# Patient Record
Sex: FEMALE | Race: WHITE | NOT HISPANIC OR LATINO | Employment: STUDENT | URBAN - METROPOLITAN AREA
[De-identification: names, ages, dates, MRNs, and addresses within clinical notes are randomized per-mention and may not be internally consistent; named-entity substitution may affect disease eponyms.]

---

## 2017-02-16 ENCOUNTER — OFFICE VISIT (OUTPATIENT)
Dept: NEUROLOGY | Facility: CLINIC | Age: 22
End: 2017-02-16
Payer: COMMERCIAL

## 2017-02-16 VITALS
HEIGHT: 59 IN | BODY MASS INDEX: 19.69 KG/M2 | DIASTOLIC BLOOD PRESSURE: 68 MMHG | HEART RATE: 73 BPM | WEIGHT: 97.69 LBS | SYSTOLIC BLOOD PRESSURE: 109 MMHG

## 2017-02-16 DIAGNOSIS — F98.8 ADD (ATTENTION DEFICIT DISORDER): ICD-10-CM

## 2017-02-16 DIAGNOSIS — R56.9 SEIZURE: Primary | ICD-10-CM

## 2017-02-16 PROCEDURE — 99999 PR PBB SHADOW E&M-NEW PATIENT-LVL III: CPT | Mod: PBBFAC,,, | Performed by: PSYCHIATRY & NEUROLOGY

## 2017-02-16 PROCEDURE — 99205 OFFICE O/P NEW HI 60 MIN: CPT | Mod: S$GLB,,, | Performed by: PSYCHIATRY & NEUROLOGY

## 2017-02-16 NOTE — PROGRESS NOTES
EPILEPSY CLINIC:   INITIAL VISIT    Name: Bhavna Han  MRN:[id number]   CSN: [id number]  Date of service: 2/16/2017    Age:21 y.o.   Gender:female     Referring Physician/Service: Self Referral  No address on file   The patient is here today with her mother  History obtained from the patient and her mother    CHIEF COMPLAINT: Consult  - evaluation of 1st seizure    PRESENT ILLNESS:      This is a 21 y.o.  year old right handed female who presents for evaluation of 1st seizure  Chief Complaint   Patient presents with    Consult      1st seizure on 02/02/2017:  Patient states that she was out the night prior the seizure, was up until ~ 2:30am and admits to having etoh (admits to be being drunk) and smoking marijuana. She was on antibiotics for UTI at the time as well.  Reports waking up feeling fine (denies any hangover) and ~09:00 she was walking to her dorm followed by LOC. Her next recollection was waking up on the ground with people around her.  She reportedly fell to the ground and had generalized shaking with her eyes rolled up and frothing at the mouth - lasted ~ 2-3 min. Patient reports associated tongue bit but  No hx of bladder/bowel incontinence. Reports brief (few min) post-ictal confusion.  EMS was called and she was evaluated at [facility]: Labs showed methamphetamine and marijuana positive; CT head was negative, and no neurological deficits. Not started on any meds.    No further episodes.    Possible lateralizing signs by history: (Neurological signs): none    Any other relevant information:  Hx of 'blackout' episodes with etoh use - approximately 5 episodes    PREVIOUS EVALUATIONS:    Previous EEGs: none  Previous MRIs: none    Additional tests:  1)CT Scan: no abnormalities (per report, [facility], 2/2/17)  2) EEG\Video Monitoring: no  3) PET Scan: no  4) Neuropsychological evaluation: no  5) DEXA Scan: no  6) Others: no    RISK FACTORS FOR SEIZURES:    1. Head Trauma:  No    2. CNS Infections:  No  3. CNS  Tumors: No     4. CNS Vascular Disease: No     5. Febrile Seizures: No  - but reports one episode at 18mo of age when she 'went limp' -  Associated with dehydration. No convulsive event noted. Was evaluated at the time.  6. Developmental Delay: No       7. Family History of Seizures: No    8. Birth history: FTNVD    Pregnancy/Labor/Delivery: n/a    CURRENT MEDICATIONS:   No current outpatient prescriptions on file.     No current facility-administered medications for this visit.       Folic acid: no    CURRENT ANTI EPILEPTIC MEDICATIONS: none    VAGAL NERVE STIMULATOR: n/a    PRIOR ANTICONVULSANT HISTORY:  none      PAST MEDICAL HISTORY:   Active Ambulatory Problems     Diagnosis Date Noted    No Active Ambulatory Problems     Resolved Ambulatory Problems     Diagnosis Date Noted    No Resolved Ambulatory Problems     No Additional Past Medical History        PAST PSYCHIATRIC HISTORY: ADD - diagnosed at age 15yrs - maintained Vivanz 50mg q day    PAST SURGICAL HISTORY including Epilepsy surgery: History reviewed. No pertinent past surgical history.     FAMILY HISTORY: History reviewed. No pertinent family history.      SOCIAL HISTORY:   Social History     Social History    Marital status: Single     Spouse name: N/A    Number of children: N/A    Years of education: N/A     Occupational History    Not on file.     Social History Main Topics    Smoking status: Never Smoker    Smokeless tobacco: Not on file    Alcohol use Not on file    Drug use: Not on file    Sexual activity: Not on file     Other Topics Concern    Not on file     Social History Narrative    No narrative on file      a) Marital status: Single                                                    b) Living situation: patient lives with in the dorm at [facility]  c) Employed/Unemployed/Other: Student - mass communication at [employer]    DRIVING HISTORY:  Currently driving: No      LEVEL OF EDUCATION: student at [employer]    SUBSTANCE USE: uses  "marijuana occasionally - 1-2 per month; regular etoh use 1-2 per week; no other substance use    ALLERGIES: Review of patient's allergies indicates no known allergies.     REVIEW OF SYSTEMS:  Review of Systems   Constitutional: Negative for appetite change and fatigue.   HENT: Negative for dental problem and sore throat.    Eyes: Negative for photophobia and pain.   Respiratory: Negative for cough and shortness of breath.    Cardiovascular: Negative for chest pain and palpitations.   Gastrointestinal: Negative for nausea and vomiting.   Endocrine: Negative for polydipsia and polyuria.   Genitourinary: Negative for menstrual problem and vaginal bleeding.   Musculoskeletal: Negative for arthralgias and joint swelling.   Skin: Negative for rash and wound.   Allergic/Immunologic: Negative for immunocompromised state.   All other systems reviewed and are negative.      GENERAL EXAMINATION:  Visit Vitals    /68    Pulse 73    Ht 4' 11" (1.499 m)    Wt 44.3 kg (97 lb 10.6 oz)    BMI 19.73 kg/m2      General Appearance:    This is an small-built female who appears well.  HEENT: There are no dysmorphic features  Skin: There are no obvious stigmata for neurocutaneous disorders.  Neck: supple   Cardiovascular: regular rate and rhythm  Lungs: clear  Abdomen: deferred  The spine is non-tender.  Kyphosis:  No Scoliosis: No   Extremities: Warm and well perfused    NEUROLOGICAL EXAMINATION:  Mental status: Alert and oriented x 4; pleasant and cooperative with exam  Memory: Recent memory intact, Remote memory intact, Age correct, Month correct  Attention and concentration: intact  Fund of knowledge: adequate  Speech: normal  Dysarthria: No   Eyes: PERRL; EOM intact; No nystagmus.The visual pursuits  were smooth with normal saccadic eye movements.   Fundoscopic Exam: normal w/o hemorrhages, exudates, or papilledema  No facial asymmetry. Intact facial sensation bilaterally.  Hearing was intact bilaterally to finger " rub  Tongue and palate was in the midline  Intact SCM and trapezii bilaterally     Motor examination:  Normal bulk and tone bilaterally. Power: no pronater drift; 5/5 bilaterally symmetric UE/LE  Abnormal movements: none  Deep tendon reflexes: 2+ bilaterally symmetric UE/LE with flexor plantars  Dysmetria: No     Sensory examination:   Normal, light touch, pin prick, and vibration bilaterally symmetric UE/LE    Gait:  Normal gait and station; able to tandem walk without any difficulty    OTHER RELEVANT LABS AND TESTS:    IMPRESSION:  The patient's history is suggestive of 1st seizure - likely related to etoh use and sleep deprivation    Related Condition(s): none    PLAN:  1) MRI Brain  2) Sleep-deprived extended >1hr EEG  3) No AEDs at this time  4) Continue Vivanz at current doses    Discussed in detail re diagnosis and plan of care. Also advised re avoidance of triggers as well as seizure precautions/restrictions.   Patient and her mother indicated understanding.    2014 EPILEPSY QUALITY MEASUREMENT (AAN)  1 a. Seizure Frequency: as noted  1b. Seizure Intervention: as noted    2.  a. Etiology: as noted  b. Seizure Type: asnoted  c. Epilepsy Syndrome: n/a    3.  a. Side-effects of AED: n/a   b. Intervention for side-effects: n/a      4. Screening for psychiatric or behavioral disorders: yes    5. Personalized epilepsy safety issues and education: yes    6. Counseling for women of child-bearing potential and epilepsy: yes     7. Referral of treatment-resistant epilepsy to comprehensive epilepsy center (q 2 years): N/A.    The patient was asked to call me/the clinic 1 week after the test(s) are done to obtain results.    The following issues were  all discussed in detail with the patient and family/caregiver(s):    1. Diagnosis, plans, prognosis, medications and possible side-effects, risks and benefits of treatment, other alternatives to AEDs.  2. Risks related to continued seizures, status epilepticus, SUDEP,  driving restrictions and seizure precautions ( no baths but showers are ok, no swimming unsupervised, no use of heavy machinery, no use of sharp moving objects, avoid heights).   3. Issues related to pregnancy, OCP and breast feeding as it relates to epilepsy.  4. The potential of teratogenicity and suicidal risks of anti-epileptic medications.  5.Avoid any activity that compromise patient safety related to seizures.     Questions and concerns raised by the patient and family/care-giver(s) were addressed and they indicated understanding of everything discussed and agreed to plans as above.    Return in 4-6 weeks, after tests are done or earlier prn.    Briseida Rawls MD, TOM().  Neurology-Epilepsy.

## 2017-03-01 ENCOUNTER — HOSPITAL ENCOUNTER (OUTPATIENT)
Dept: RADIOLOGY | Facility: HOSPITAL | Age: 22
Discharge: HOME | End: 2017-03-01
Attending: PSYCHIATRY & NEUROLOGY
Payer: COMMERCIAL

## 2017-03-01 DIAGNOSIS — R56.9 SEIZURE: ICD-10-CM

## 2017-03-01 PROCEDURE — 70551 MRI BRAIN STEM W/O DYE: CPT | Mod: TC

## 2017-03-01 PROCEDURE — 70551 MRI BRAIN STEM W/O DYE: CPT | Mod: 26,,, | Performed by: RADIOLOGY

## 2017-03-03 ENCOUNTER — HOSPITAL ENCOUNTER (OUTPATIENT)
Dept: NEUROLOGY | Facility: HOSPITAL | Age: 22
Discharge: HOME | End: 2017-03-03
Attending: PSYCHIATRY & NEUROLOGY
Payer: COMMERCIAL

## 2017-03-03 DIAGNOSIS — R56.9 SEIZURE: ICD-10-CM

## 2017-03-03 PROCEDURE — 95816 EEG AWAKE AND DROWSY: CPT

## 2017-03-03 PROCEDURE — 95819 EEG AWAKE AND ASLEEP: CPT | Mod: 26,,, | Performed by: PSYCHIATRY & NEUROLOGY

## 2017-03-03 NOTE — PROCEDURES
DATE OF SERVICE:  03/03/2017.    Routine outpatient EEG done at [facility].    DURATION OF THE STUDY:  34 minutes and 35 seconds.    ELECTROENCEPHALOGRAM REPORT    METHODOLOGY:  Electroencephalographic (EEG) recording is recorded with   electrodes placed according to the International 10-20 placement system.  Thirty   two (32) channels of digital signal (sampling rate of 512/sec), including T1   and T2, were simultaneously recorded from the scalp and may include EKG, EMG,   and/or eye monitors.  Recording band pass was 0.1 to 512 Hz.  Digital video   recording of the patient is simultaneously recorded with the EEG.  The patient   is instructed to report clinical symptoms which may occur during the recording   session.  EEG and video recording are stored and archived in digital format.    Activation procedures, which include photic stimulation, hyperventilation and   instructing patients to perform simple tasks, are done in selected patients.    The EEG is displayed on a monitor screen and can be reviewed using different   montages.  Computer assisted-analysis is employed to detect spike and   electrographic seizure activity.   The entire record is submitted for computer   analysis.  The entire recording is visually reviewed, and the times identified   by computer analysis as being spikes or seizures are reviewed again.    Compressed spectral analysis (CSA) is also performed on the activity recorded   from each individual channel.  This is displayed as a power display of   frequencies from 0 to 30 Hz over time.   The CSA is reviewed looking for   asymmetries in power between homologous areas of the scalp, then compared with   the original EEG recording.    Wiki[employer] software was also utilized in the review of this study.  This software   suite analyzes the EEG recording in multiple domains.  Coherence and rhythmicity   are computed to identify EEG sections which may contain organized seizures.    Each channel  undergoes analysis to detect the presence of spike and sharp waves   which have special and morphological characteristics of epileptic activity.  The   routine EEG recording is converted from special into frequency domain.  This is   then displayed comparing homologous areas to identify areas of significant   asymmetry.  Algorithm to identify non-cortically generated artifact is used to   separate artifact from the EEG.    FINDINGS:  Initially, the background of this study reveals predominantly alpha   activity with a well-formed, well-modulated 11 Hz posterior dominant rhythm seen   best in the occipital channels.  There is also scattered beta frequency   activity seen centrally and anteriorly.  After a few pages, the alpha disappears   and central beta activity predominates.  The patient appears to be drowsy.    Occasional vertex waves are seen and central sleep spindles then emerged   indicating the presence of stage II sleep.  The patient waxes and wanes between   wakefulness and stage II sleep over the next several pages.    In the final ____ of the study, photic stimulation is performed revealing a   brisk, symmetrical driving response at multiple frequencies bilaterally.    Hyperventilation is then performed and causes no significant change to the   background rhythms.  Hyperventilation was terminated early due to the patient   subjectively feeling dizzy.    There are no epileptiform discharges or electrographic seizures during this   study.  There were no focal findings.  EKG revealed regular rhythm.    INTERPRETATION:  Normal, awake and asleep EEG.      NBB/SN  dd: 03/03/2017 13:54:53 (CST)  td: 03/03/2017 15:11:25 (CST)  Doc ID   #[id number]  Job ID #[id number]    CC:

## 2017-03-10 ENCOUNTER — PATIENT MESSAGE (OUTPATIENT)
Dept: NEUROLOGY | Facility: CLINIC | Age: 22
End: 2017-03-10

## 2017-04-01 ENCOUNTER — HOSPITAL ENCOUNTER (INPATIENT)
Facility: HOSPITAL | Age: 22
LOS: 4 days | Discharge: HOME | DRG: 101 | End: 2017-04-05
Attending: EMERGENCY MEDICINE | Admitting: EMERGENCY MEDICINE
Payer: COMMERCIAL

## 2017-04-01 DIAGNOSIS — G40.919 BREAKTHROUGH SEIZURE: Primary | ICD-10-CM

## 2017-04-01 DIAGNOSIS — G40.319 INTRACTABLE GENERALIZED IDIOPATHIC EPILEPSY WITHOUT STATUS EPILEPTICUS: ICD-10-CM

## 2017-04-01 PROBLEM — F90.9 ADHD (ATTENTION DEFICIT HYPERACTIVITY DISORDER): Status: ACTIVE | Noted: 2017-04-01

## 2017-04-01 LAB
ALBUMIN SERPL BCP-MCNC: 4 G/DL
ALP SERPL-CCNC: 75 U/L
ALT SERPL W/O P-5'-P-CCNC: 14 U/L
AMORPH CRY UR QL COMP ASSIST: ABNORMAL
ANION GAP SERPL CALC-SCNC: 11 MMOL/L
AST SERPL-CCNC: 20 U/L
B-HCG UR QL: NEGATIVE
BASOPHILS # BLD AUTO: 0.02 K/UL
BASOPHILS NFR BLD: 0.2 %
BILIRUB SERPL-MCNC: 0.4 MG/DL
BILIRUB UR QL STRIP: NEGATIVE
BUN SERPL-MCNC: 12 MG/DL
CALCIUM SERPL-MCNC: 10.1 MG/DL
CHLORIDE SERPL-SCNC: 109 MMOL/L
CLARITY UR REFRACT.AUTO: ABNORMAL
CO2 SERPL-SCNC: 21 MMOL/L
COLOR UR AUTO: YELLOW
CREAT SERPL-MCNC: 0.8 MG/DL
CTP QC/QA: YES
DIFFERENTIAL METHOD: ABNORMAL
EOSINOPHIL # BLD AUTO: 0 K/UL
EOSINOPHIL NFR BLD: 0.1 %
ERYTHROCYTE [DISTWIDTH] IN BLOOD BY AUTOMATED COUNT: 12.9 %
EST. GFR  (AFRICAN AMERICAN): >60 ML/MIN/1.73 M^2
EST. GFR  (NON AFRICAN AMERICAN): >60 ML/MIN/1.73 M^2
GLUCOSE SERPL-MCNC: 91 MG/DL
GLUCOSE UR QL STRIP: NEGATIVE
HCT VFR BLD AUTO: 41.8 %
HGB BLD-MCNC: 14 G/DL
HGB UR QL STRIP: NEGATIVE
KETONES UR QL STRIP: NEGATIVE
LEUKOCYTE ESTERASE UR QL STRIP: NEGATIVE
LYMPHOCYTES # BLD AUTO: 1.4 K/UL
LYMPHOCYTES NFR BLD: 16.6 %
MCH RBC QN AUTO: 30.1 PG
MCHC RBC AUTO-ENTMCNC: 33.5 %
MCV RBC AUTO: 90 FL
MICROSCOPIC COMMENT: ABNORMAL
MONOCYTES # BLD AUTO: 0.6 K/UL
MONOCYTES NFR BLD: 6.7 %
NEUTROPHILS # BLD AUTO: 6.5 K/UL
NEUTROPHILS NFR BLD: 76.2 %
NITRITE UR QL STRIP: NEGATIVE
PH UR STRIP: 7 [PH] (ref 5–8)
PLATELET # BLD AUTO: 298 K/UL
PMV BLD AUTO: 9.7 FL
POTASSIUM SERPL-SCNC: 3.5 MMOL/L
PROT SERPL-MCNC: 7.6 G/DL
PROT UR QL STRIP: NEGATIVE
RBC # BLD AUTO: 4.65 M/UL
RBC #/AREA URNS AUTO: 7 /HPF (ref 0–4)
SODIUM SERPL-SCNC: 141 MMOL/L
SP GR UR STRIP: 1.02 (ref 1–1.03)
URN SPEC COLLECT METH UR: ABNORMAL
UROBILINOGEN UR STRIP-ACNC: NEGATIVE EU/DL
WBC # BLD AUTO: 8.5 K/UL

## 2017-04-01 PROCEDURE — 99284 EMERGENCY DEPT VISIT MOD MDM: CPT | Mod: ,,, | Performed by: EMERGENCY MEDICINE

## 2017-04-01 PROCEDURE — 81001 URINALYSIS AUTO W/SCOPE: CPT

## 2017-04-01 PROCEDURE — 63600175 PHARM REV CODE 636 W HCPCS: Performed by: HOSPITALIST

## 2017-04-01 PROCEDURE — 63600175 PHARM REV CODE 636 W HCPCS: Performed by: EMERGENCY MEDICINE

## 2017-04-01 PROCEDURE — 80053 COMPREHEN METABOLIC PANEL: CPT

## 2017-04-01 PROCEDURE — 96365 THER/PROPH/DIAG IV INF INIT: CPT

## 2017-04-01 PROCEDURE — 11000001 HC ACUTE MED/SURG PRIVATE ROOM

## 2017-04-01 PROCEDURE — 85025 COMPLETE CBC W/AUTO DIFF WBC: CPT

## 2017-04-01 PROCEDURE — 99285 EMERGENCY DEPT VISIT HI MDM: CPT | Mod: 25

## 2017-04-01 PROCEDURE — 99223 1ST HOSP IP/OBS HIGH 75: CPT | Mod: ,,, | Performed by: INTERNAL MEDICINE

## 2017-04-01 PROCEDURE — 80177 DRUG SCRN QUAN LEVETIRACETAM: CPT

## 2017-04-01 PROCEDURE — 81025 URINE PREGNANCY TEST: CPT | Performed by: EMERGENCY MEDICINE

## 2017-04-01 RX ORDER — ACETAMINOPHEN 325 MG/1
650 TABLET ORAL EVERY 8 HOURS PRN
Status: DISCONTINUED | OUTPATIENT
Start: 2017-04-01 | End: 2017-04-05 | Stop reason: HOSPADM

## 2017-04-01 RX ORDER — LEVETIRACETAM 500 MG/1
500 TABLET ORAL 2 TIMES DAILY
COMMUNITY
End: 2017-04-25

## 2017-04-01 RX ORDER — LEVETIRACETAM 500 MG/1
1000 TABLET ORAL 2 TIMES DAILY
Status: DISCONTINUED | OUTPATIENT
Start: 2017-04-02 | End: 2017-04-03

## 2017-04-01 RX ORDER — LISDEXAMFETAMINE DIMESYLATE 50 MG/1
50 CAPSULE ORAL EVERY MORNING
COMMUNITY

## 2017-04-01 RX ORDER — GLUCAGON 1 MG
1 KIT INJECTION
Status: DISCONTINUED | OUTPATIENT
Start: 2017-04-01 | End: 2017-04-05 | Stop reason: HOSPADM

## 2017-04-01 RX ORDER — ENOXAPARIN SODIUM 100 MG/ML
40 INJECTION SUBCUTANEOUS EVERY 24 HOURS
Status: DISCONTINUED | OUTPATIENT
Start: 2017-04-01 | End: 2017-04-05 | Stop reason: HOSPADM

## 2017-04-01 RX ORDER — IBUPROFEN 200 MG
16 TABLET ORAL
Status: DISCONTINUED | OUTPATIENT
Start: 2017-04-01 | End: 2017-04-05 | Stop reason: HOSPADM

## 2017-04-01 RX ORDER — LORAZEPAM 2 MG/ML
1 INJECTION INTRAMUSCULAR EVERY 4 HOURS PRN
Status: DISCONTINUED | OUTPATIENT
Start: 2017-04-01 | End: 2017-04-02

## 2017-04-01 RX ORDER — ONDANSETRON 8 MG/1
8 TABLET, ORALLY DISINTEGRATING ORAL EVERY 8 HOURS PRN
Status: DISCONTINUED | OUTPATIENT
Start: 2017-04-01 | End: 2017-04-05 | Stop reason: HOSPADM

## 2017-04-01 RX ORDER — DESOGESTREL AND ETHINYL ESTRADIOL 21-5 (28)
1 KIT ORAL DAILY
Status: DISCONTINUED | OUTPATIENT
Start: 2017-04-02 | End: 2017-04-02

## 2017-04-01 RX ORDER — LEVETIRACETAM 10 MG/ML
1000 INJECTION INTRAVASCULAR
Status: COMPLETED | OUTPATIENT
Start: 2017-04-01 | End: 2017-04-01

## 2017-04-01 RX ORDER — IBUPROFEN 200 MG
24 TABLET ORAL
Status: DISCONTINUED | OUTPATIENT
Start: 2017-04-01 | End: 2017-04-05 | Stop reason: HOSPADM

## 2017-04-01 RX ADMIN — LEVETIRACETAM 1000 MG: 10 INJECTION INTRAVENOUS at 08:04

## 2017-04-01 RX ADMIN — ENOXAPARIN SODIUM 40 MG: 100 INJECTION SUBCUTANEOUS at 10:04

## 2017-04-01 NOTE — ED PROVIDER NOTES
"Encounter Date: 4/1/2017       History     Chief Complaint   Patient presents with    Seizures     Review of patient's allergies indicates:  No Known Allergies  HPI Comments: 23 y/o F Robert sophbib with h/o ADHD, seizure dz on keppra presents s/p witnessed sz. Pt had had 1st sz early this month, after which she presented to [facility] and subsequently had neg EEG and MRI done at [facility]. She re-presented to [facility] ED later with 2nd time sz, was started on keppra 500 BID and given strict seizure precautions, with f/u appt 4/14. She was in usual state of health when she had a 3rd sz PTA despite reporting compliance with keppra use. Pt was taking a shower when she apparently fell down and her roommate heard a "thump," found her to have GTC activity of unk duration (likely < 1-2 min)- with associated tongue biting and brief post-ictal pd- self resolved. She admits to smoking marijuana yest, but denies other intox and states she smokes marijuana anyways. Still taking her ADHD med which she was instructed to continue per [facility] neuro. Denies HA, n/v, f/c, URI sx, recent illness, focal neuro or visual changes.    The history is provided by the patient.            History reviewed. No pertinent past medical history.  History reviewed. No pertinent surgical history.  History reviewed. No pertinent family history.  Social History   Substance Use Topics    Smoking status: Never Smoker    Smokeless tobacco: None    Alcohol use None     Review of Systems   Constitutional: Negative for chills, diaphoresis, fatigue and fever.   HENT: Negative for congestion, rhinorrhea, sinus pressure and sore throat.    Eyes: Negative for visual disturbance.   Respiratory: Negative for cough and shortness of breath.    Cardiovascular: Negative for chest pain and palpitations.   Gastrointestinal: Negative for abdominal pain, blood in stool, constipation, diarrhea, nausea and vomiting.   Genitourinary: Negative for difficulty urinating, dysuria, flank " pain, frequency, hematuria, pelvic pain, urgency, vaginal bleeding and vaginal discharge.   Musculoskeletal: Negative for back pain and neck pain.   Skin: Negative for rash.   Neurological: Positive for seizures. Negative for dizziness, tremors, syncope, speech difficulty, weakness, light-headedness, numbness and headaches.   Hematological: Does not bruise/bleed easily.   Psychiatric/Behavioral: Negative for behavioral problems and confusion.       Physical Exam   Initial Vitals   BP Pulse Resp Temp SpO2   04/01/17 1602 04/01/17 1602 04/01/17 1602 04/01/17 1602 04/01/17 1602   126/90 120 20 98 °F (36.7 °C) 100 %     Physical Exam    Nursing note and vitals reviewed.  Constitutional: She appears well-developed and well-nourished. She is not diaphoretic. No distress.   Well appearing young thin F in NAD with friend at bedside   HENT:   Head: Normocephalic and atraumatic.   Mouth/Throat: Oropharynx is clear and moist. No oropharyngeal exudate.   MMM, no e/o tongue biting   Eyes: EOM are normal. Pupils are equal, round, and reactive to light. No scleral icterus.   No horiz/vertic nystagmus or diplopia   Neck: Normal range of motion. Neck supple. No tracheal deviation present. No JVD present.   Neck FROM; no midline / paraspinal c-spine TTP stepoff or deformity   Cardiovascular: Normal rate, regular rhythm and intact distal pulses. Exam reveals no gallop and no friction rub.    No murmur heard.  Pulmonary/Chest: Breath sounds normal. No stridor. She has no wheezes. She has no rhonchi. She has no rales.   Abdominal: Soft. There is no tenderness.   Musculoskeletal: She exhibits no edema or tenderness.   Neurological: She is alert and oriented to person, place, and time. She has normal strength. She displays no tremor and normal reflexes. No cranial nerve deficit or sensory deficit. She exhibits normal muscle tone. She displays a negative Romberg sign. She displays no seizure activity. Coordination and gait (narrow based  and steady) normal. GCS eye subscore is 4. GCS verbal subscore is 5. GCS motor subscore is 6.   Reflex Scores:       Patellar reflexes are 2+ on the right side and 2+ on the left side.  Neg dysmetria, ddk, pronator drift, clonus   Skin: Skin is warm and dry. No rash noted.   Psychiatric: She has a normal mood and affect. Thought content normal.       Bhaskar Garcia MD  4/1/17 10:32 PM    ED Course   Procedures  Labs Reviewed   COMPREHENSIVE METABOLIC PANEL - Abnormal; Notable for the following:        Result Value    CO2 21 (*)     All other components within normal limits   CBC W/ AUTO DIFFERENTIAL - Abnormal; Notable for the following:     Gran% 76.2 (*)     Lymph% 16.6 (*)     All other components within normal limits   URINALYSIS - Abnormal; Notable for the following:     Appearance, UA Cloudy (*)     All other components within normal limits   URINALYSIS MICROSCOPIC - Abnormal; Notable for the following:     RBC, UA 7 (*)     Amorphous, UA Many (*)     All other components within normal limits   LEVETIRACETAM  (KEPPRA) LEVEL   POCT URINE PREGNANCY                   APC / Resident Notes:   HO-3 MDM: Pt presented as above- with apparent breakthrough GTC sz (similar to her priors) despite compliance with new keppra regimen. NC/AT, neuro non-focal, and labs unremarkable. C-spine cleared per [facility]. CT head was ordered from triage, given pt's fall and need to r/o ICH as cause for her sz, and resulted as neg. We spoke with epileptologist on-call, who strongly recommended that pt be admitted for at least several days, for continuous monitoring including continuous EEG, given the higher risk that she may have subsequent sz, and need to obtain clearer dx of epilepsy vs alternative cause for her sz. Pt was keppra loaded here, and admitted to medicine / inpt epilepsy / step-down unit, with epileptology service planning to follow her in AM (when c/s by hospitalist). Case and plan d/w staff Dr. Miller.  Bhaskar Garcia MD  4/1/17  10:34 PM         Attending Attestation:   Physician Attestation Statement for Resident:  As the supervising MD   Physician Attestation Statement: I have personally seen and examined this patient.   I agree with the above history. -: 23 yo f, recently dx'd w epilepsy, started on keppra, here with breakthrough seizure, at baseline MS now with normal neuro exam.  Will discuss AED management and dosing with neurology but anticipate that pt will likely be able to be d/c'd home   As the supervising MD I agree with the above PE.    As the supervising MD I agree with the above treatment, course, plan, and disposition.  I have reviewed and agree with the residents interpretation of the following: lab data and CT scans.  I have reviewed the following: old records at this facility.                    ED Course     Clinical Impression:   The encounter diagnosis was Breakthrough seizure.          Bhaskar Garcia MD  Resident  04/01/17 1828       Sylwia Miller MD  04/03/17 1835

## 2017-04-01 NOTE — LETTER
April 5, 2017         [address]  [place] [address]  Phone: [phone]  Fax: [phone]       Patient: Bhavna Han   YOB: 1995  Date of Visit: 04/05/2017    To Whom It May Concern:    Bhavna Parmar was admitted as a patient at [facility] from 04/01/2017 to  04/05/2017. She may return to school on 04/10/2017 with no restrictions. If you have any questions or concerns, or if I can be of further assistance, please do not hesitate to contact me.    Sincerely,    Suleiman Bojorquez IV, MD

## 2017-04-01 NOTE — PROVIDER PROGRESS NOTES - EMERGENCY DEPT.
Encounter Date: 4/1/2017    ED Physician Progress Notes       SCRIBE NOTE: I, Miroslava Triplett, am scribing for, and in the presence of,  Dr. Singer.  Physician Statement: I, Dr. Singer, personally performed the services described in this documentation as scribed by Miroslava Triplett in my presence, and it is both accurate and complete.     Physician Note:   Time seen by provider: 4:37 PM    This is a 22 y.o. female with no pertinent medical history who presents s/p seizure prior to arrival. Per pt's friend, the episode lasted 5 minutes and she hit the back of her head. She states the pt was confused for several minutes after the episode. Pt notes this is her third seizure and she is currently taking keppra 50 mg. She is concerned the keppra could be reacting with her birth control. She notes she had a headache and was nauseated after the episode but both of these have resolved. Pt denies increased stress, dental infections, fever, dysuria, weakness, fatigue, and recent alcohol use. Pt does note that she smoke marijuana last night.     I initially evaluated this patient and ordered workup while in intake.  The patient will receive a full evaluation in an ED pod when space is available.  All results from tests ordered in intake will not be followed by the intake team, including myself. All results will be followed by the ED Pod team.

## 2017-04-01 NOTE — IP AVS SNAPSHOT
[facility]  [address]  [facility] [address]  Phone: [phone]           Patient Discharge Instructions   Our goal is to set you up for success. This packet includes information on your condition, medications, and your home care.  It will help you care for yourself to prevent having to return to the hospital.     Please ask your nurse if you have any questions.      There are many details to remember when preparing to leave the hospital. Here is what you will need to do:    1. Take your medicine. If you are prescribed medications, review your Medication List on the following pages. You may have new medications to  at the pharmacy and others that you'll need to stop taking. Review the instructions for how and when to take your medications. Talk with your doctor or nurses if you are unsure of what to do.     2. Go to your follow-up appointments. Specific follow-up information is listed in the following pages. Your may be contacted by a nurse or clinical provider about future appointments. Be sure we have all of the phone numbers to reach you. Please contact your provider's office if you are unable to make an appointment.     3. Watch for warning signs. Your doctor or nurse will give you detailed warning signs to watch for and when to call for assistance. These instructions may also include educational information about your condition. If you experience any of warning signs to your health, call your doctor.           [facility]  Unless otherwise directed by your provider, please   contact [facility], our nurse care line   that is available for 24/7 assistance.     [phone] (toll-free)     Registered nurses in the [facility]   provide: appointment scheduling, clinical advisement, health education, and other advisory services.                  ** Verify the list of medication(s) below is accurate and up to date. Carry this with you in case of  emergency. If your medications have changed, please notify your healthcare provider.             Medication List      START taking these medications        Additional Info                      lamotrigine 25 MG tablet   Commonly known as:  LAMICTAL   Quantity:  30 tablet   Refills:  0   Dose:  25 mg    Last time this was given:  25 mg on 4/5/2017  8:55 AM   Instructions:  Take 1 tablet (25 mg total) by mouth once daily.     Begin Date    AM    Noon    PM    Bedtime         CONTINUE taking these medications        Additional Info                      doxycycline 50 MG tablet   Commonly known as:  ADOXA   Refills:  0   Dose:  50 mg    Instructions:  Take 50 mg by mouth daily as needed (for acne).     Begin Date    AM    Noon    PM    Bedtime       levetiracetam 500 MG Tab   Commonly known as:  KEPPRA   Refills:  0   Dose:  500 mg    Last time this was given:  1,000 mg on 4/3/2017  9:35 AM   Instructions:  Take 500 mg by mouth 2 (two) times daily.     Begin Date    AM    Noon    PM    Bedtime       lisdexamfetamine 50 MG capsule   Commonly known as:  VYVANSE   Refills:  0   Dose:  50 mg    Last time this was given:  50 mg on 4/5/2017  8:56 AM   Instructions:  Take 50 mg by mouth every morning.     Begin Date    AM    Noon    PM    Bedtime       LO LOESTRIN FE 1 mg-10 mcg (24)/10 mcg (2) Tab   Refills:  0   Generic drug:  norethindrone-e.estradiol-iron    Instructions:  Take by mouth.     Begin Date    AM    Noon    PM    Bedtime            Where to Get Your Medications      These medications were sent to [facility] [address] [facility] [address] AT [facility] [address]  [facility] [address] [facility] [address]    Hours:  24-hours Phone:  [phone]     lamotrigine 25 MG tablet                  Please bring to all follow up appointments:    1. A copy of your discharge instructions.  2. All medicines you are currently taking in their original bottles.  3. Identification and insurance  card.    Please arrive 15 minutes ahead of scheduled appointment time.    Please call 24 hours in advance if you must reschedule your appointment and/or time.        Your Scheduled Appointments     Apr 17, 2017  3:30 PM CDT   Follow Up/Office Visit with Briseida Rawls MD   [facility] - Neurology Epilepsy ([facility] )    [address], [address]  [facility] [id number] [phone]              Follow-up Information     Follow up with Briseida Rawls MD On 4/17/2017.    Specialty:  Neurology    Why:  At regularly scheduled appointment    Contact information:    [address]  [id number]  [facility] [address]  [phone]          Discharge Instructions     Future Orders    Activity as tolerated     Call MD for:  difficulty breathing or increased cough     Call MD for:  increased confusion or weakness     Call MD for:  persistent dizziness, light-headedness, or visual disturbances     Call MD for:  persistent nausea and vomiting or diarrhea     Call MD for:  redness, tenderness, or signs of infection (pain, swelling, redness, odor or green/yellow discharge around incision site)     Call MD for:  severe persistent headache     Call MD for:  severe uncontrolled pain     Call MD for:  temperature >100.4     Call MD for:  worsening rash     Diet general     Questions:    Total calories:      Fat restriction, if any:      Protein restriction, if any:      Na restriction, if any:      Fluid restriction:      Additional restrictions:          Discharge Instructions       Date Name Bhavna Han MRN [id number]      4/4/2017             Lamictal  Lamictal   Keppra     Dosage 25 mg  100 mg  500 mg     Yesterday 0   0   0  0   2017/4/4 1   0   1  1   2017/4/11 2   0   1  1   2017/4/18 3   0   1  1   2017/4/25 0   1   1  1   2017/5/2 1   1   1  1   2017/5/9 2   1   ½  ½   2017/5/16 3   1   0 stop 0   2017/5/23 0   2 stay on this dose            Primary Diagnosis     Your primary diagnosis was:  Seizure Disorder     "  Admission Information     Date & Time Provider Department CSN    4/1/2017  4:10 PM Lukas Schuster MD [facility] [id number]      Care Providers     Provider Role Specialty Primary office phone    Lukas Schuster MD Attending Provider Hospitalist [phone]    Lukas Schuster MD Team Attending  Hospitalist [phone]      Your Vitals Were     BP Pulse Temp Resp Height Weight    111/59 (BP Location: Right arm, Patient Position: Lying, BP Method: Automatic) 80 97.7 °F (36.5 °C) (Oral) 14 4' 11" (1.499 m) 42.2 kg (93 lb)    SpO2 BMI             95% 18.78 kg/m2         Recent Lab Values     No lab values to display.      Allergies as of 4/5/2017        Reactions    Tramadol Other (See Comments)      Advance Directives     An advance directive is a document which, in the event you are no longer able to make decisions for yourself, tells your healthcare team what kind of treatment you do or do not want to receive, or who you would like to make those decisions for you.  If you do not currently have an advance directive, [facility] encourages you to create one.  For more information call:  [phone] ([phone]),  or log on to [email or web address].        Language Assistance Services     ATTENTION: Language assistance services are available, free of charge. Please call [phone].      ATENCIÓN: Si habla español, tiene a short disposición servicios gratuitos de asistencia lingüística. Llame al [phone].     CHÚ Ý: N?u b?n nói Ti?ng Vi?t, có các d?ch v? h? tr? ngôn ng? mi?n phí dành cho b?n. G?i s? [phone].         [facility] complies with applicable Federal civil rights laws and does not discriminate on the basis of race, color, national origin, age, disability, or sex.        "

## 2017-04-01 NOTE — ED PROVIDER NOTES
Encounter Date: 4/1/2017       History     Chief Complaint   Patient presents with    Seizures     Review of patient's allergies indicates:  No Known Allergies  HPI Comments: Time seen by provider: 4:37 PM    This is a 22 y.o. female who presents with complaint of     The history is provided by the patient.     No past medical history on file.  No past surgical history on file.  No family history on file.  Social History   Substance Use Topics    Smoking status: Never Smoker    Smokeless tobacco: Not on file    Alcohol use Not on file     Review of Systems    Physical Exam   Initial Vitals   BP Pulse Resp Temp SpO2   04/01/17 1602 04/01/17 1602 04/01/17 1602 04/01/17 1602 04/01/17 1602   126/90 120 20 98 °F (36.7 °C) 100 %     Physical Exam    ED Course   Procedures  Labs Reviewed - No data to display                            ED Course     Clinical Impression:   There were no encounter diagnoses.

## 2017-04-01 NOTE — ED TRIAGE NOTES
Ist seizure feb 2, 2017. Was attributed to marijuanna  and alcohol.Had another 3 weeks ago. Drank the night before that seizure,.Had another seizure today at 251p- while in the shower, duration 5 minutes. Denies alcohol or drugs.  Has been taking keppra. No hx of febrile seizures as a child.  States she bit her tongue today. May have struck the back of her head when she fell in the shower. Denies neck pain , had nausea initially. Denies missing doses of keppra or recent n/v/d.AAOx4, skin w/d, respirations even and unlabored at present. Accompanied by friend. Side rails up, bed in low position and b rake engaged.  Call bell in reach.

## 2017-04-02 PROBLEM — F90.9 ADHD (ATTENTION DEFICIT HYPERACTIVITY DISORDER): Status: ACTIVE | Noted: 2017-04-02

## 2017-04-02 LAB
ALBUMIN SERPL BCP-MCNC: 3.5 G/DL
ALP SERPL-CCNC: 64 U/L
ALT SERPL W/O P-5'-P-CCNC: 12 U/L
AMPHET+METHAMPHET UR QL: NORMAL
ANION GAP SERPL CALC-SCNC: 11 MMOL/L
AST SERPL-CCNC: 18 U/L
BARBITURATES UR QL SCN>200 NG/ML: NEGATIVE
BENZODIAZ UR QL SCN>200 NG/ML: NEGATIVE
BILIRUB SERPL-MCNC: 0.4 MG/DL
BUN SERPL-MCNC: 11 MG/DL
BZE UR QL SCN: NEGATIVE
CALCIUM SERPL-MCNC: 9.3 MG/DL
CANNABINOIDS UR QL SCN: NEGATIVE
CHLORIDE SERPL-SCNC: 108 MMOL/L
CO2 SERPL-SCNC: 22 MMOL/L
CREAT SERPL-MCNC: 0.7 MG/DL
CREAT UR-MCNC: 184 MG/DL
CRP SERPL-MCNC: 0.87 MG/L
ERYTHROCYTE [SEDIMENTATION RATE] IN BLOOD BY WESTERGREN METHOD: 9 MM/HR
EST. GFR  (AFRICAN AMERICAN): >60 ML/MIN/1.73 M^2
EST. GFR  (NON AFRICAN AMERICAN): >60 ML/MIN/1.73 M^2
ETHANOL UR-MCNC: <10 MG/DL
GLUCOSE SERPL-MCNC: 86 MG/DL
MAGNESIUM SERPL-MCNC: 2.1 MG/DL
METHADONE UR QL SCN>300 NG/ML: NEGATIVE
OPIATES UR QL SCN: NEGATIVE
PCP UR QL SCN>25 NG/ML: NEGATIVE
PHOSPHATE SERPL-MCNC: 4.5 MG/DL
POTASSIUM SERPL-SCNC: 3.5 MMOL/L
PROT SERPL-MCNC: 6.8 G/DL
SODIUM SERPL-SCNC: 141 MMOL/L
TOXICOLOGY INFORMATION: NORMAL
TSH SERPL DL<=0.005 MIU/L-ACNC: 1.06 UIU/ML

## 2017-04-02 PROCEDURE — 25000003 PHARM REV CODE 250: Performed by: HOSPITALIST

## 2017-04-02 PROCEDURE — 85651 RBC SED RATE NONAUTOMATED: CPT

## 2017-04-02 PROCEDURE — 36415 COLL VENOUS BLD VENIPUNCTURE: CPT

## 2017-04-02 PROCEDURE — 84100 ASSAY OF PHOSPHORUS: CPT

## 2017-04-02 PROCEDURE — 83735 ASSAY OF MAGNESIUM: CPT

## 2017-04-02 PROCEDURE — 86141 C-REACTIVE PROTEIN HS: CPT

## 2017-04-02 PROCEDURE — 80053 COMPREHEN METABOLIC PANEL: CPT

## 2017-04-02 PROCEDURE — 80307 DRUG TEST PRSMV CHEM ANLYZR: CPT

## 2017-04-02 PROCEDURE — 20600001 HC STEP DOWN PRIVATE ROOM

## 2017-04-02 PROCEDURE — 86703 HIV-1/HIV-2 1 RESULT ANTBDY: CPT

## 2017-04-02 PROCEDURE — 63600175 PHARM REV CODE 636 W HCPCS: Performed by: HOSPITALIST

## 2017-04-02 PROCEDURE — 84443 ASSAY THYROID STIM HORMONE: CPT

## 2017-04-02 PROCEDURE — 86592 SYPHILIS TEST NON-TREP QUAL: CPT

## 2017-04-02 RX ORDER — DOXYCYCLINE 50 MG/1
50 TABLET ORAL DAILY PRN
COMMUNITY

## 2017-04-02 RX ORDER — LORAZEPAM 2 MG/ML
2 INJECTION INTRAMUSCULAR
Status: DISCONTINUED | OUTPATIENT
Start: 2017-04-02 | End: 2017-04-05 | Stop reason: HOSPADM

## 2017-04-02 RX ADMIN — ENOXAPARIN SODIUM 40 MG: 100 INJECTION SUBCUTANEOUS at 04:04

## 2017-04-02 RX ADMIN — LEVETIRACETAM 1000 MG: 500 TABLET, FILM COATED ORAL at 09:04

## 2017-04-02 RX ADMIN — LEVETIRACETAM 1000 MG: 500 TABLET, FILM COATED ORAL at 08:04

## 2017-04-02 NOTE — H&P
[facility] Hospital Medicine  History & Physical    Patient Name: Bhavna Han  MRN: [id number]  Admission Date: 4/1/2017  Attending Physician: Jose Luis Butler MD   Primary Care Provider: Primary [facility] Medicine Team: [facility] 1 Sandra Alanis MD     Patient information was obtained from patient and ER records.     Subjective:     Principal Problem:<principal problem not specified>    Chief Complaint:   Chief Complaint   Patient presents with    Seizures        HPI: 22 y.o. female with h/o ADHD who presents seizure.  She was in the shower when the seizure occurred around 3PM.  Her roommate noted her screaming and shaking.  She was confused after the seizure for a few minutes.  She denies loss of bowel or bladder function.  She reports tongue injury.  The episode lasted 5 minutes and she hit the back of her head.  Does not take blood thinners.  CT in the ED revealed no acute changes.  She has had two seizures prior this.  The first seizure occurred in February and the second occurred in March. She has been taking Keppra 500 bid since March 16. She reports she has missed 1-2 doses nighttime doses.  Pt does note that she smoke marijuana last night. She denies fever chills, N/V, diarrhea, abdominal pain, dysuria, numbness, tingling.      History reviewed. No pertinent past medical history.    History reviewed. No pertinent surgical history.    Review of patient's allergies indicates:  No Known Allergies    No current facility-administered medications on file prior to encounter.      No current outpatient prescriptions on file prior to encounter.     Family History     None        Social History Main Topics    Smoking status: Never Smoker    Smokeless tobacco: Not on file    Alcohol use Not on file    Drug use: Not on file    Sexual activity: Not on file     Review of Systems   Constitutional: Negative for chills and fever.   HENT: Negative for sneezing and sore  throat.    Eyes: Negative for pain and visual disturbance.   Respiratory: Negative for cough, shortness of breath and wheezing.    Cardiovascular: Negative for chest pain and leg swelling.   Gastrointestinal: Negative for abdominal pain, constipation, diarrhea and nausea.   Genitourinary: Negative for dysuria, frequency and urgency.   Allergic/Immunologic: Negative for environmental allergies.   Neurological: Positive for seizures. Negative for dizziness, light-headedness, numbness and headaches.   Psychiatric/Behavioral: Negative for agitation. The patient is not nervous/anxious.      Objective:     Vital Signs (Most Recent):  Temp: 98 °F (36.7 °C) (04/01/17 1602)  Pulse: 78 (04/01/17 1908)  Resp: (!) 23 (04/01/17 1908)  BP: (!) 145/74 (04/01/17 1903)  SpO2: 100 % (04/01/17 1908) Vital Signs (24h Range):  Temp:  [98 °F (36.7 °C)] 98 °F (36.7 °C)  Pulse:  [] 78  Resp:  [17-23] 23  SpO2:  [100 %] 100 %  BP: (124-145)/(74-90) 145/74     Weight: 42.2 kg (93 lb)  Body mass index is 18.78 kg/(m^2).    Physical Exam   Constitutional: She is oriented to person, place, and time. She appears well-developed and well-nourished. No distress.   HENT:   Head: Normocephalic and atraumatic.   Nose: Nose normal.   No obvious tongue ulceration, but patient feels the tip is swollen.   Eyes: EOM are normal. No scleral icterus.   Neck: Normal range of motion. No tracheal deviation present.   Cardiovascular: Normal rate and regular rhythm.  Exam reveals no gallop and no friction rub.    No murmur heard.  Pulmonary/Chest: Effort normal. No respiratory distress. She has no wheezes. She has no rales.   Abdominal: Soft. Bowel sounds are normal. There is no tenderness.   Musculoskeletal: Normal range of motion. She exhibits no edema.   Neurological: She is alert and oriented to person, place, and time.   Skin: Skin is warm and dry.   Psychiatric: She has a normal mood and affect. Her behavior is normal.        Significant Labs:   CBC:  "  Recent Labs  Lab 04/01/17  1652   WBC 8.50   HGB 14.0   HCT 41.8        CMP:   Recent Labs  Lab 04/01/17  1652      K 3.5      CO2 21*   GLU 91   BUN 12   CREATININE 0.8   CALCIUM 10.1   PROT 7.6   ALBUMIN 4.0   BILITOT 0.4   ALKPHOS 75   AST 20   ALT 14   ANIONGAP 11   EGFRNONAA >60.0     All pertinent labs within the past 24 hours have been reviewed.    Significant Imaging: I have reviewed all pertinent imaging results/findings within the past 24 hours.    Assessment/Plan:     Breakthrough seizure  --she received 1 gram keppra load while in the ED  --neurology epilepsy consult ordered  --vEEG ordered, telemetry  --seizure precautions, neuro checks q4  --UDS, TSH, HIV, RPR, ESR, CRP with morning labs.  --ativan 1 mg PRN seizures  --keppra 1 g bid starting tomorrow    ADHD  --holding home vyvanse for now      VTE Risk Mitigation         Ordered     enoxaparin injection 40 mg  Daily     Route:  Subcutaneous        04/01/17 2145     Medium Risk of VTE  Once      04/01/17 2145        Sandra Alanis MD  Department of Hospital Medicine   [facility]    I HAVE PERSONALLY TAKEN THE HISTORY, EXAMINED THIS PATIENT,  AND AGREE WITH THE RESIDENT'S NOTE AS STATED ABOVE WITH THE FOLLOWING EXCEPTIONS: Patient seen and examined with the intern at 10:30pm on 1/4/17    Ms. Han has a history of recurrent seizure like convulsions which started in February after drinking and smoking marijuana.  She also has a history of ADHD and continually takes Vyvanse.  This recurred over Mardi Gras, also after a day of heavy alcohol use and smoking marijuana.  After that episode she was seen at [facility] and was placed on Keppra 500mg po bid.  She had a 5 minute seizure today which her friends described as, "whole body was shaking."  She was confused and awoke in her dorm with EMS there.  She again does admit that she smoked some marijuana last night as well, but has not been drinking in several weeks.  "     Assessment and plan:  Convulsions, nos - by history, does seem c/w seizure  -Loaded with Keppra 1g in ed  -Increase PO Keppra to 1g bid from 500mg po bid  -VEEG, telemetry  -Check HIV, RPR, CRP, ESR, TSH  -Prolactin with next seizure  -Urine drug screen  -Neurology epilepsy consult    ETOH abuse  -Her drinking seems more binge-episodic  -?Rebound lowering seizure threshold    Marijuana abuse  -She is not sure if this was synthetic or natural    ADHD  -Long term use of Vyvanse (amphetamines)      Temp:  [97.7 °F (36.5 °C)-98 °F (36.7 °C)]   Pulse:  []   Resp:  [17-23]   BP: (120-145)/(70-90)   SpO2:  [99 %-100 %]    Recent Results (from the past 24 hour(s))   Comprehensive metabolic panel    Collection Time: 04/01/17  4:52 PM   Result Value Ref Range    Sodium 141 136 - 145 mmol/L    Potassium 3.5 3.5 - 5.1 mmol/L    Chloride 109 95 - 110 mmol/L    CO2 21 (L) 23 - 29 mmol/L    Glucose 91 70 - 110 mg/dL    BUN, Bld 12 6 - 20 mg/dL    Creatinine 0.8 0.5 - 1.4 mg/dL    Calcium 10.1 8.7 - 10.5 mg/dL    Total Protein 7.6 6.0 - 8.4 g/dL    Albumin 4.0 3.5 - 5.2 g/dL    Total Bilirubin 0.4 0.1 - 1.0 mg/dL    Alkaline Phosphatase 75 55 - 135 U/L    AST 20 10 - 40 U/L    ALT 14 10 - 44 U/L    Anion Gap 11 8 - 16 mmol/L    eGFR if African American >60.0 >60 mL/min/1.73 m^2    eGFR if non African American >60.0 >60 mL/min/1.73 m^2   CBC auto differential    Collection Time: 04/01/17  4:52 PM   Result Value Ref Range    WBC 8.50 3.90 - 12.70 K/uL    RBC 4.65 4.00 - 5.40 M/uL    Hemoglobin 14.0 12.0 - 16.0 g/dL    Hematocrit 41.8 37.0 - 48.5 %    MCV 90 82 - 98 fL    MCH 30.1 27.0 - 31.0 pg    MCHC 33.5 32.0 - 36.0 %    RDW 12.9 11.5 - 14.5 %    Platelets 298 150 - 350 K/uL    MPV 9.7 9.2 - 12.9 fL    Gran # 6.5 1.8 - 7.7 K/uL    Lymph # 1.4 1.0 - 4.8 K/uL    Mono # 0.6 0.3 - 1.0 K/uL    Eos # 0.0 0.0 - 0.5 K/uL    Baso # 0.02 0.00 - 0.20 K/uL    Gran% 76.2 (H) 38.0 - 73.0 %    Lymph% 16.6 (L) 18.0 - 48.0 %    Mono% 6.7  4.0 - 15.0 %    Eosinophil% 0.1 0.0 - 8.0 %    Basophil% 0.2 0.0 - 1.9 %    Differential Method Automated    Urinalysis - Clean Catch    Collection Time: 04/01/17  4:57 PM   Result Value Ref Range    Specimen UA Urine, Clean Catch     Color, UA Yellow Yellow, Straw, Marlys    Appearance, UA Cloudy (A) Clear    pH, UA 7.0 5.0 - 8.0    Specific Gravity, UA 1.020 1.005 - 1.030    Protein, UA Negative Negative    Glucose, UA Negative Negative    Ketones, UA Negative Negative    Bilirubin (UA) Negative Negative    Occult Blood UA Negative Negative    Nitrite, UA Negative Negative    Urobilinogen, UA Negative <2.0 EU/dL    Leukocytes, UA Negative Negative   Urinalysis Microscopic    Collection Time: 04/01/17  4:57 PM   Result Value Ref Range    RBC, UA 7 (H) 0 - 4 /hpf    Amorphous, UA Many (A) None-Moderate    Microscopic Comment SEE COMMENT    POCT urine pregnancy    Collection Time: 04/01/17  4:58 PM   Result Value Ref Range    POC Preg Test, Ur Negative Negative     Acceptable Yes

## 2017-04-02 NOTE — ASSESSMENT & PLAN NOTE
- Received 1g Keppra load while in the ED  - Neurology Epilepsy following along  - 24H vEEG ordered, telemetry  - Seizure precautions, neuro checks Q4H  - UDS, TSH, HIV, RPR, ESR, CRP negative  - Ativan 2 mg PRN for seizure activity  - Will hold Keppra 1g PO BID while on continuous vEEG monitoring

## 2017-04-02 NOTE — ASSESSMENT & PLAN NOTE
- Will resume Vyvanse (home med) per Epilepsy  - Attempting to keep patient on home regimen where she is seizing while monitored by vEEG

## 2017-04-02 NOTE — SUBJECTIVE & OBJECTIVE
History reviewed. No pertinent past medical history.    History reviewed. No pertinent surgical history.    Review of patient's allergies indicates:  No Known Allergies    No current facility-administered medications on file prior to encounter.      No current outpatient prescriptions on file prior to encounter.     Family History     None        Social History Main Topics    Smoking status: Never Smoker    Smokeless tobacco: Not on file    Alcohol use Not on file    Drug use: Not on file    Sexual activity: Not on file     Review of Systems   Constitutional: Negative for chills and fever.   HENT: Negative for sneezing and sore throat.    Eyes: Negative for pain and visual disturbance.   Respiratory: Negative for cough, shortness of breath and wheezing.    Cardiovascular: Negative for chest pain and leg swelling.   Gastrointestinal: Negative for abdominal pain, constipation, diarrhea and nausea.   Genitourinary: Negative for dysuria, frequency and urgency.   Allergic/Immunologic: Negative for environmental allergies.   Neurological: Positive for seizures. Negative for dizziness, light-headedness, numbness and headaches.   Psychiatric/Behavioral: Negative for agitation. The patient is not nervous/anxious.      Objective:     Vital Signs (Most Recent):  Temp: 98 °F (36.7 °C) (04/01/17 1602)  Pulse: 78 (04/01/17 1908)  Resp: (!) 23 (04/01/17 1908)  BP: (!) 145/74 (04/01/17 1903)  SpO2: 100 % (04/01/17 1908) Vital Signs (24h Range):  Temp:  [98 °F (36.7 °C)] 98 °F (36.7 °C)  Pulse:  [] 78  Resp:  [17-23] 23  SpO2:  [100 %] 100 %  BP: (124-145)/(74-90) 145/74     Weight: 42.2 kg (93 lb)  Body mass index is 18.78 kg/(m^2).    Physical Exam   Constitutional: She is oriented to person, place, and time. She appears well-developed and well-nourished. No distress.   HENT:   Head: Normocephalic and atraumatic.   Nose: Nose normal.   No obvious tongue ulceration, but patient feels the tip is swollen.   Eyes: EOM are  normal. No scleral icterus.   Neck: Normal range of motion. No tracheal deviation present.   Cardiovascular: Normal rate and regular rhythm.  Exam reveals no gallop and no friction rub.    No murmur heard.  Pulmonary/Chest: Effort normal. No respiratory distress. She has no wheezes. She has no rales.   Abdominal: Soft. Bowel sounds are normal. There is no tenderness.   Musculoskeletal: Normal range of motion. She exhibits no edema.   Neurological: She is alert and oriented to person, place, and time.   Skin: Skin is warm and dry.   Psychiatric: She has a normal mood and affect. Her behavior is normal.        Significant Labs:   CBC:   Recent Labs  Lab 04/01/17  1652   WBC 8.50   HGB 14.0   HCT 41.8        CMP:   Recent Labs  Lab 04/01/17  1652      K 3.5      CO2 21*   GLU 91   BUN 12   CREATININE 0.8   CALCIUM 10.1   PROT 7.6   ALBUMIN 4.0   BILITOT 0.4   ALKPHOS 75   AST 20   ALT 14   ANIONGAP 11   EGFRNONAA >60.0     All pertinent labs within the past 24 hours have been reviewed.    Significant Imaging: I have reviewed all pertinent imaging results/findings within the past 24 hours.

## 2017-04-02 NOTE — NURSING
Patient arrived to floor via stretcher. Patient is awake, alert, and oriented x4. Patient denies any pain at this time. IV site intact and saline locked. Bed in lowest position with wheels locked. Side rails up x3. Call light within reach. Will continue to monitor.

## 2017-04-02 NOTE — PLAN OF CARE
Problem: Patient Care Overview  Goal: Plan of Care Review  Outcome: Ongoing (interventions implemented as appropriate)  Plan of care reviewed with patient with no questions or concerns at this time. Pain was assessed and managed throughout shift. SCD's in place. Cardiac monitoring in place during shift.  IV intact and saline locked with flush performed. Neuro checks performed every 4 hours. Patient remained free from falls during shift. Seizure precautions in place. Fall precautions in place. Will continue to monitor.

## 2017-04-03 LAB
ALBUMIN SERPL BCP-MCNC: 3.4 G/DL
ALP SERPL-CCNC: 69 U/L
ALT SERPL W/O P-5'-P-CCNC: 13 U/L
ANION GAP SERPL CALC-SCNC: 9 MMOL/L
AST SERPL-CCNC: 16 U/L
BILIRUB SERPL-MCNC: 0.2 MG/DL
BUN SERPL-MCNC: 11 MG/DL
CALCIUM SERPL-MCNC: 9.2 MG/DL
CHLORIDE SERPL-SCNC: 104 MMOL/L
CO2 SERPL-SCNC: 30 MMOL/L
CREAT SERPL-MCNC: 0.8 MG/DL
EST. GFR  (AFRICAN AMERICAN): >60 ML/MIN/1.73 M^2
EST. GFR  (NON AFRICAN AMERICAN): >60 ML/MIN/1.73 M^2
GLUCOSE SERPL-MCNC: 87 MG/DL
HIV 1+2 AB+HIV1 P24 AG SERPL QL IA: NEGATIVE
MAGNESIUM SERPL-MCNC: 2.4 MG/DL
PHOSPHATE SERPL-MCNC: 3.9 MG/DL
POTASSIUM SERPL-SCNC: 3.6 MMOL/L
PROT SERPL-MCNC: 6.7 G/DL
RPR SER QL: NORMAL
SODIUM SERPL-SCNC: 143 MMOL/L

## 2017-04-03 PROCEDURE — 83735 ASSAY OF MAGNESIUM: CPT

## 2017-04-03 PROCEDURE — 95951 HC EEG MONITORING/VIDEO RECORD: CPT

## 2017-04-03 PROCEDURE — 84100 ASSAY OF PHOSPHORUS: CPT

## 2017-04-03 PROCEDURE — 99232 SBSQ HOSP IP/OBS MODERATE 35: CPT | Mod: ,,, | Performed by: HOSPITALIST

## 2017-04-03 PROCEDURE — 80053 COMPREHEN METABOLIC PANEL: CPT

## 2017-04-03 PROCEDURE — 36415 COLL VENOUS BLD VENIPUNCTURE: CPT

## 2017-04-03 PROCEDURE — 99233 SBSQ HOSP IP/OBS HIGH 50: CPT | Mod: ,,, | Performed by: PSYCHIATRY & NEUROLOGY

## 2017-04-03 PROCEDURE — 95957 EEG DIGITAL ANALYSIS: CPT

## 2017-04-03 PROCEDURE — 63600175 PHARM REV CODE 636 W HCPCS: Performed by: HOSPITALIST

## 2017-04-03 PROCEDURE — 25000003 PHARM REV CODE 250: Performed by: HOSPITALIST

## 2017-04-03 PROCEDURE — 20600001 HC STEP DOWN PRIVATE ROOM

## 2017-04-03 PROCEDURE — 95951 PR EEG MONITORING/VIDEORECORD: CPT | Mod: 26,,, | Performed by: PSYCHIATRY & NEUROLOGY

## 2017-04-03 RX ADMIN — LEVETIRACETAM 1000 MG: 500 TABLET, FILM COATED ORAL at 09:04

## 2017-04-03 RX ADMIN — ENOXAPARIN SODIUM 40 MG: 100 INJECTION SUBCUTANEOUS at 05:04

## 2017-04-03 NOTE — PLAN OF CARE
Extended Emergency Contact Information  Primary Emergency Contact: Julianne Scott  Address: [address] [place] [address] [place]  Home Phone: [phone]  Mobile Phone: [phone]  Relation: Mother    Primary Doctor No    Future Appointments  Date Time Provider Department Center   4/17/2017 3:30 PM Briseida Rawls MD [facility] E[facility]     Payor: BLUE CROSS BLUE SHIELD / Plan: BCBS ALL OUT OF STATE / Product Type: PPO /       [facility] - [place], [place] - [address] AT [facility] [address]  [address]  [facility] [address]  Phone: [phone] Fax: [phone]       04/03/17 1402   Discharge Assessment   Assessment Type Discharge Planning Assessment   Confirmed/corrected address and phone number on facesheet? Yes   Assessment information obtained from? Patient;Medical Record   Expected Length of Stay (days) 4   Communicated expected length of stay with patient/caregiver yes   Prior to hospitilization cognitive status: Alert/Oriented   Prior to hospitalization functional status: Independent   Current cognitive status: Alert/Oriented   Current Functional Status: Independent   Arrived From home or self-care   Lives With other (see comments)  (Robert Adams. Lives in dorm)   Able to Return to Prior Arrangements yes   Is patient able to care for self after discharge? Yes   How many people do you have in your home that can help with your care after discharge? other (see comments)   Patient's perception of discharge disposition home or selfcare   Readmission Within The Last 30 Days no previous admission in last 30 days   Patient currently being followed by outpatient case management? No   Patient currently receives home health services? No   Does the patient currently use HME? No   Patient currently receives private duty nursing? N/A   Patient currently receives any other outside agency services? No   Equipment Currently Used at Home none   Do  you have any problems affording any of your prescribed medications? No   Is the patient taking medications as prescribed? yes   Do you have any financial concerns preventing you from receiving the healthcare you need? No   Does the patient have transportation to healthcare appointments? Yes   Transportation Available family or friend will provide   On Dialysis? No   Does the patient receive services at the Coumadin Clinic? No   Are there any open cases? No   Discharge Plan A Home   Discharge Plan B Home with family   Patient/Family In Agreement With Plan yes

## 2017-04-03 NOTE — PLAN OF CARE
Problem: Patient Care Overview  Goal: Plan of Care Review  Plan of care reviewed with patient and mother.  Verbalized understanding.  Patient is alert and oriented time4.  No acute neuro changes noticed throughout the night.  There is no seizure noticed.  VSS. Resting comfortably in bed.  Will continue to monitor.

## 2017-04-03 NOTE — SUBJECTIVE & OBJECTIVE
Interval History: NAEON. Patient offers no complaints at this time.    Review of Systems   Constitutional: Negative for activity change, appetite change, chills, diaphoresis, fatigue and fever.   HENT: Negative for congestion, postnasal drip, rhinorrhea, sinus pressure and trouble swallowing.    Eyes: Negative for photophobia, redness and visual disturbance.   Respiratory: Negative for cough, chest tightness, shortness of breath and wheezing.    Cardiovascular: Negative for chest pain, palpitations and leg swelling.   Gastrointestinal: Negative for abdominal distention, abdominal pain, blood in stool, constipation, diarrhea, nausea and vomiting.   Endocrine: Negative for polyuria.   Genitourinary: Negative for decreased urine volume, difficulty urinating, dysuria, flank pain, frequency and hematuria.   Musculoskeletal: Negative for arthralgias, back pain, joint swelling, myalgias and neck pain.   Skin: Negative for pallor, rash and wound.   Allergic/Immunologic: Negative for immunocompromised state.   Neurological: Negative for dizziness, tremors, seizures, speech difficulty, weakness, light-headedness and headaches.   Psychiatric/Behavioral: Negative for agitation, behavioral problems, confusion, decreased concentration, dysphoric mood and sleep disturbance. The patient is not nervous/anxious.      Objective:     Vital Signs (Most Recent):  Temp: 99 °F (37.2 °C) (04/03/17 0415)  Pulse: 68 (04/03/17 0700)  Resp: 14 (04/03/17 0415)  BP: 128/62 (04/03/17 0415)  SpO2: 96 % (04/03/17 0415) Vital Signs (24h Range):  Temp:  [98.1 °F (36.7 °C)-99 °F (37.2 °C)] 99 °F (37.2 °C)  Pulse:  [60-90] 68  Resp:  [14-18] 14  SpO2:  [95 %-98 %] 96 %  BP: (106-128)/(57-64) 128/62     Weight: 42.2 kg (93 lb)  Body mass index is 18.78 kg/(m^2).    Intake/Output Summary (Last 24 hours) at 04/03/17 0809  Last data filed at 04/03/17 0415   Gross per 24 hour   Intake              850 ml   Output                0 ml   Net              850 ml       Physical Exam   Constitutional: She is oriented to person, place, and time. She appears well-developed and well-nourished.   HENT:   Head: Normocephalic and atraumatic.   Right Ear: External ear normal.   Left Ear: External ear normal.   Eyes: Conjunctivae and EOM are normal. Pupils are equal, round, and reactive to light. Right eye exhibits no discharge. Left eye exhibits no discharge. No scleral icterus.   Neck: Normal range of motion. Neck supple. No JVD present. No tracheal deviation present. No thyromegaly present.   Cardiovascular: Normal rate, regular rhythm and normal heart sounds.  Exam reveals no gallop and no friction rub.    No murmur heard.  Pulmonary/Chest: Effort normal and breath sounds normal. No stridor. No respiratory distress. She has no wheezes. She has no rales. She exhibits no tenderness.   Abdominal: Soft. Bowel sounds are normal. She exhibits no distension and no mass. There is no tenderness. There is no rebound and no guarding. No hernia.   Musculoskeletal: Normal range of motion. She exhibits no edema, tenderness or deformity.   Neurological: She is alert and oriented to person, place, and time. No cranial nerve deficit. She exhibits normal muscle tone. Coordination normal.   Skin: Skin is warm and dry. No rash noted. No erythema. No pallor.   Psychiatric: She has a normal mood and affect. Her behavior is normal. Judgment and thought content normal.   Nursing note and vitals reviewed.      Significant Labs:   CBC:   Recent Labs  Lab 04/01/17  1652   WBC 8.50   HGB 14.0   HCT 41.8        CMP:   Recent Labs  Lab 04/01/17  1652 04/02/17  0631 04/03/17  0352    141 143   K 3.5 3.5 3.6    108 104   CO2 21* 22* 30*   GLU 91 86 87   BUN 12 11 11   CREATININE 0.8 0.7 0.8   CALCIUM 10.1 9.3 9.2   PROT 7.6 6.8 6.7   ALBUMIN 4.0 3.5 3.4*   BILITOT 0.4 0.4 0.2   ALKPHOS 75 64 69   AST 20 18 16   ALT 14 12 13   ANIONGAP 11 11 9   EGFRNONAA >60.0 >60.0 >60.0     TSH:   Recent  Labs  Lab 04/02/17  0631   TSH 1.065       Significant Imaging: I have reviewed all pertinent imaging results/findings within the past 24 hours.

## 2017-04-03 NOTE — PHARMACY MED REC
"Admission Medication Reconciliation - Pharmacy Consult Note    The home medication history was taken by Sylwia Mehta, Pharmacy Technician.  Based on information gathered and subsequent review by the clinical pharmacist, the items below may need attention.     You may go to "Admission" then "Reconcile Home Medications" tabs to review and/or act upon these items. Based on information gathered and subsequent review by the clinical pharmacist, the items below may need attention.    o Patient is taking a drug DIFFERENTLY than how ordered upon admit  o Levetiracetam 500 mg oral BID (increased to 1000 mg oral BID during current admission)    Potential issues to be addressed PRIOR TO DISCHARGE  Patient would prefer to have discharge prescriptions delivered to bedside at discharge.    Please address this information as you see fit.  Feel free to contact us if you have any questions or require assistance.    María Pulido, PharmD  Emergency Medicine Clinical Pharmacist  [phone] (2pm-midnight daily)      Patient's prior to admission medication regimen was as follows:  Prescriptions Prior to Admission   Medication Sig Dispense Refill Last Dose    doxycycline (ADOXA) 50 MG tablet Take 50 mg by mouth daily as needed (for acne).       levetiracetam (KEPPRA) 500 MG Tab Take 500 mg by mouth 2 (two) times daily.   4/1/2017    lisdexamfetamine (VYVANSE) 50 MG capsule Take 50 mg by mouth every morning.   4/1/2017    norethindrone-e.estradiol-iron (LO LOESTRIN FE) 1 mg-10 mcg (24)/10 mcg (2) Tab Take by mouth.            Please add appropriate    SmartPhrase below:           "

## 2017-04-03 NOTE — CONSULTS
[facility]  Epilepsy Consult Note  Name: Bhavna Han  MRN: [id number]   CSN: [id number]      Date: 04/03/2017    SUBJECTIVE:       History of Present Illness:  The patient is a 22 y.o. yo RHWF   The patient was initially referred for consultation by Dr. Millan.   The patient was accompanied by her mother who provided some of the history.      Patient presented to ED on 4/1/17. She reports that she had a seizure while taking a shower. States that her roommate heard her yell and hit the ground, when she walked into the bathroom she had whole body stiffening and shaking. Patient reports that she has had 2 seizures in addition to the one on 4/1/17. The first in February that was attributed to drinking and THC use, the second was in March and she was started on Keppra 500 mg BID after that one. Patient does reports that prior to the seizure on Saturday that she had missed 2 night time doses of keppra. Reports with all 3 seizures that she was sleep deprived. Has a history of zoning out spells in school as a child. No further seizures since being admitted.     Epilepsy History (Per Dr. Rawls's Clinic Note):  1st seizure on 02/02/2017:  Patient states that she was out the night prior the seizure, was up until ~ 2:30am and admits to having etoh (admits to be being drunk) and smoking marijuana. She was on antibiotics for UTI at the time as well.  Reports waking up feeling fine (denies any hangover) and ~09:00 she was walking to her dorm followed by LOC. Her next recollection was waking up on the ground with people around her.  She reportedly fell to the ground and had generalized shaking with her eyes rolled up and frothing at the mouth - lasted ~ 2-3 min. Patient reports associated tongue bit but No hx of bladder/bowel incontinence. Reports brief (few min) post-ictal confusion.  EMS was called and she was evaluated at [facility]: Labs showed methamphetamine and marijuana positive; CT head was negative, and no  neurological deficits. Not started on any meds.     No further episodes.    Seizure Seminology  Seizure Type 1  Classification:   Aura -   Ictus  - Nonconv -  - Conv - whole body stiffening and jerking, +tongue biting   - Duration - 2-3 minutes   Post-ictal  - Symptoms - confused  - Duration  Age of onset - 22  Current Seizure Frequency -  3 total   Last Seizure - 4/1/17    Seizure Triggers  Sleep Deprivation - Yes  Other medications - None  Psych/stress - None  Photic stimulation - None  Hyperventilation - None  Medical Problems - None  Menses - No  Sensory Stimulation (light, sound, etc) - None  Missed dose of meds - Yes    AED Treatments  Present regimen  Keppra 500 mg BID    Prior treatments  None    Not tried  acetazolamide (Diamox, AZM)  amantadine  carbamazepine (Tegretol, CBZ)  clobezam (Onfi or Frizium, CLB)  ethosuximide (Zarontin, ESM)  eslicarbazine (Aptiom, ESL)  felbamate (felbatol, FBM)  gabapentin (Neurontin, GPN)  lacosamide (Vimpat, LCS)   lamotrigine (Lamictal, LTG)   methsuximide (Celontin, MSM)  methyphenytoin (Mesantion, MHT)  oxcarbazepine (Trileptal OXC)  perampanel (Fycompa, FCP)   phenobarbital (Pb)  phenytoin (Dilantin, PHT)  pregabalin (Lyrica, PGB)  primidone (Mysoline, PRM)  retigabine (Potiga, RTG)  rufinamide (Banzel, RUF)  tiagabine (Gabatril,  TGB)  topiramate (Topamax, TPM)  viagabatrin, (Sabril, VGB)  vagal nerve stimulator (VNS)  valproic acid (Depakote, VPA)  zonisamide (Zonegran, ZNA)  Benzodiazepines  diazepam - rectal (Diastatl)  diazepam - oral (Valium, DZ)  clonazepam (Klonopin, CZP)  clorazepate (Tranxene, CLZ)  Ativan  Brain Stimulation  Vagal Nerve Stimulation-n/a  DBS- n/a    Compliance method  Memory - yes  Mom or Spouse - Yes  Pill Box - no  Ian calendar - no  Turn over medication bottle - no  Phone alarm - no    Seizure Evaluation  EEG Routine -   3/13/17  INTERPRETATION: Normal, awake and asleep EEG  EEG Ambulatory -   EEG\Video Monitoring -   MRI/MRA -    3/1/17  Unremarkable noncontrast MRI of the brain as detailed above specifically without focal parenchymal signal abnormality or acute infarction.  CT/CTA Scan -  4/1/17  Normal Head CT.   PET Scan -   Neuropsychological evaluation -   DEXA Scan    Potential Epilepsy Risk Factors:   Pregnancy/Labor/Delivery - full term uncomplicated pregnancy labor and vaginal delivery  Febrile seizures - No  - but reports one episode at 18mo of age when she 'went limp' - Associated with dehydration. No convulsive event noted. Was evaluated at the time.  Head injury  - none  CNS infection - none     Stroke - none  Family Hx of Sz - none    Review of Systems:  Constitutional: no fever or chills  Eyes: no visual changes  ENT: no nasal congestion or sore throat  Respiratory: no cough or shortness of breath  Cardiovascular: no chest pain or palpitations  Gastrointestinal: no nausea or vomiting, no abdominal pain or change in bowel habits  Genitourinary: no hematuria or dysuria  Hematologic/Lymphatic: no easy bruising or lymphadenopathy  Musculoskeletal: no arthralgias or myalgias  Neurological: positive for seizures, negative for dizziness, headaches, tremors and weakness  Behavioral/Psych: no auditory or visual hallucinations  Endocrine: no heat or cold intolerance    PAST MEDICAL HISTORY:   Active Ambulatory Problems     Diagnosis Date Noted    Seizure      Resolved Ambulatory Problems     Diagnosis Date Noted    No Resolved Ambulatory Problems     No Additional Past Medical History        PAST SURGICAL HISTORY: History reviewed. No pertinent surgical history.     FAMILY HISTORY: History reviewed. No pertinent family history.      SOCIAL HISTORY:   Social History     Social History    Marital status: Single     Spouse name: N/A    Number of children: N/A    Years of education: N/A     Occupational History    Not on file.     Social History Main Topics    Smoking status: Never Smoker    Smokeless tobacco: Not on file     Alcohol use Not on file    Drug use: Not on file    Sexual activity: Not on file     Other Topics Concern    Not on file     Social History Narrative        SUBSTANCE USE:  Social History     Social History Main Topics    Smoking status: Never Smoker    Smokeless tobacco: Not on file    Alcohol use Not on file    Drug use: Not on file    Sexual activity: Not on file      Social History   Substance Use Topics    Smoking status: Never Smoker    Smokeless tobacco: Not on file    Alcohol use Not on file        ALLERGIES: Review of patient's allergies indicates no known allergies.       OBJECTIVE:     Vital Signs (Most Recent):  Temp: 98.9 °F (37.2 °C) (04/03/17 0725)  Pulse: 103 (04/03/17 1100)  Resp: 16 (04/03/17 0725)  BP: (!) 101/51 (04/03/17 0725)  SpO2: 99 % (04/03/17 0725)    Physical Exam:  General: well developed, well nourished  Eyes: conjunctivae/corneas clear. PERRL..  HENT: Head:normocephalic, atraumatic. Ears:right ear normal, left ear normal. Nose: no discharge. Throat: lips, mucosa, and tongue normal; teeth and gums normal.  Lungs:  normal respiratory effort  Cardiovascular: Heart: regular rate and rhythm. Chest Wall: not examined. Extremities: no cyanosis or edema, or clubbing. Pulses: not examined.    Higher Cortical Function:    Patient is a well developed, pleasant, well groomed individual appearing their stated age  Oriented - intact to person, place and time and followed two step instruction correctly.    Memory - Intact  Fund of knowledge was appropriate.    R-L Orientation - Intact -  Language - Speech was fluent without evidence for an aphasia.    Cranial Nerves II - XII:    EOMs were intact with normal smooth and no nystagmus.    PERRLA.  Visual fields were full to confrontation.    Motor - facial movement was symmetrical and normal.    Facial sensory - Light touch sensations were normal.    Hearing was normal to finger rub.  Palate moved well and was symmetrical with normal palatal  and oral sensation.    Tongue movement was full. Normal power and bulk was found in the massiter and rotator muscles of the neck.  Motor: Power, bulk and tone were normal in all extremities.  Sensory: Light touch and position senses were normal in all extremities.    Coordination:       Rapid alternating movements and rapid finger tapping - normal.       Finger to nose - nl.    Gait:  Station, gait and tandem walking were done without difficulty and Romberg was negative.  Tremor: resting, postural, intentional - none    Laboratory:  CBC:   Recent Labs  Lab 04/01/17  1652   WBC 8.50   RBC 4.65   HGB 14.0   HCT 41.8      MCV 90   MCH 30.1   MCHC 33.5     CMP:   Recent Labs  Lab 04/03/17  0352   GLU 87   CALCIUM 9.2   ALBUMIN 3.4*   PROT 6.7      K 3.6   CO2 30*      BUN 11   CREATININE 0.8   ALKPHOS 69   ALT 13   AST 16   BILITOT 0.2       Diagnostic Results:  Labs: Reviewed  CT: Reviewed  MRI: Reviewed  EEG: Reviewed    ASSESSMENT/PLAN:     IMPRESSION:  1. 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 ADHD      DISPOSITION:    1. Start VEEG  2. Hold Keppra   3. Seizure precautions  4. Activation procedures per protocol - sleep deprive tonight   5. IV Valium PRN for GTC greater than 5 min - hospital medicine to call epilepsy on call before administering       Discussed with Dr. Bermudez

## 2017-04-03 NOTE — PROGRESS NOTES
[facility] Hospital Medicine  Progress Note    Patient Name: Bhavna Han  MRN: [id number]  Patient Class: IP- Inpatient   Admission Date: 4/1/2017  Length of Stay: 2 days  Attending Physician: Lukas Schuster MD  Primary Care Provider: Primary Doctor [facility] Medicine Team: [facility] HOSP MED 1 Chago Gan MD    Subjective:     Principal Problem:Breakthrough seizure    HPI:  Bhavna Han is a 22 y.o. female w/ a significant PMHx of seizures and ADHD who presents to [facility] ED for recurrent seizure activity. The patient was in the shower when the seizure occurred around 15:00. Her roommate noted her screaming and shaking. She was confused after the seizure for a few minutes. She denies loss of bowel or bladder function. She reports tongue injury. The episode lasted 5 minutes and she hit the back of her head. Does not take blood thinners. CT in the ED revealed no acute changes. She has had two seizures prior this. The first seizure occurred in February and the second occurred in March of 2017. She has been taking Keppra 500 mg PO BID since 03/16/17. She reports she has missed 1-2 PM doses.  Pt does note that she smoke marijuana last night. She denies fever chills, N/V, diarrhea, abdominal pain, dysuria, numbness, tingling.      Hospital Course:  04/03/17: Patient admitted to hospital medicine for observation and 24H EEG monitoring. Neurology following along.     Interval History: NAEON. Patient offers no complaints at this time.    Review of Systems   Constitutional: Negative for activity change, appetite change, chills, diaphoresis, fatigue and fever.   HENT: Negative for congestion, postnasal drip, rhinorrhea, sinus pressure and trouble swallowing.    Eyes: Negative for photophobia, redness and visual disturbance.   Respiratory: Negative for cough, chest tightness, shortness of breath and wheezing.    Cardiovascular: Negative for chest pain, palpitations and leg swelling.    Gastrointestinal: Negative for abdominal distention, abdominal pain, blood in stool, constipation, diarrhea, nausea and vomiting.   Endocrine: Negative for polyuria.   Genitourinary: Negative for decreased urine volume, difficulty urinating, dysuria, flank pain, frequency and hematuria.   Musculoskeletal: Negative for arthralgias, back pain, joint swelling, myalgias and neck pain.   Skin: Negative for pallor, rash and wound.   Allergic/Immunologic: Negative for immunocompromised state.   Neurological: Negative for dizziness, tremors, seizures, speech difficulty, weakness, light-headedness and headaches.   Psychiatric/Behavioral: Negative for agitation, behavioral problems, confusion, decreased concentration, dysphoric mood and sleep disturbance. The patient is not nervous/anxious.      Objective:     Vital Signs (Most Recent):  Temp: 99 °F (37.2 °C) (04/03/17 0415)  Pulse: 68 (04/03/17 0700)  Resp: 14 (04/03/17 0415)  BP: 128/62 (04/03/17 0415)  SpO2: 96 % (04/03/17 0415) Vital Signs (24h Range):  Temp:  [98.1 °F (36.7 °C)-99 °F (37.2 °C)] 99 °F (37.2 °C)  Pulse:  [60-90] 68  Resp:  [14-18] 14  SpO2:  [95 %-98 %] 96 %  BP: (106-128)/(57-64) 128/62     Weight: 42.2 kg (93 lb)  Body mass index is 18.78 kg/(m^2).    Intake/Output Summary (Last 24 hours) at 04/03/17 0809  Last data filed at 04/03/17 0415   Gross per 24 hour   Intake              850 ml   Output                0 ml   Net              850 ml      Physical Exam   Constitutional: She is oriented to person, place, and time. She appears well-developed and well-nourished.   HENT:   Head: Normocephalic and atraumatic.   Right Ear: External ear normal.   Left Ear: External ear normal.   Eyes: Conjunctivae and EOM are normal. Pupils are equal, round, and reactive to light. Right eye exhibits no discharge. Left eye exhibits no discharge. No scleral icterus.   Neck: Normal range of motion. Neck supple. No JVD present. No tracheal deviation present. No thyromegaly  present.   Cardiovascular: Normal rate, regular rhythm and normal heart sounds.  Exam reveals no gallop and no friction rub.    No murmur heard.  Pulmonary/Chest: Effort normal and breath sounds normal. No stridor. No respiratory distress. She has no wheezes. She has no rales. She exhibits no tenderness.   Abdominal: Soft. Bowel sounds are normal. She exhibits no distension and no mass. There is no tenderness. There is no rebound and no guarding. No hernia.   Musculoskeletal: Normal range of motion. She exhibits no edema, tenderness or deformity.   Neurological: She is alert and oriented to person, place, and time. No cranial nerve deficit. She exhibits normal muscle tone. Coordination normal.   Skin: Skin is warm and dry. No rash noted. No erythema. No pallor.   Psychiatric: She has a normal mood and affect. Her behavior is normal. Judgment and thought content normal.   Nursing note and vitals reviewed.      Significant Labs:   CBC:   Recent Labs  Lab 04/01/17  1652   WBC 8.50   HGB 14.0   HCT 41.8        CMP:   Recent Labs  Lab 04/01/17  1652 04/02/17  0631 04/03/17  0352    141 143   K 3.5 3.5 3.6    108 104   CO2 21* 22* 30*   GLU 91 86 87   BUN 12 11 11   CREATININE 0.8 0.7 0.8   CALCIUM 10.1 9.3 9.2   PROT 7.6 6.8 6.7   ALBUMIN 4.0 3.5 3.4*   BILITOT 0.4 0.4 0.2   ALKPHOS 75 64 69   AST 20 18 16   ALT 14 12 13   ANIONGAP 11 11 9   EGFRNONAA >60.0 >60.0 >60.0     TSH:   Recent Labs  Lab 04/02/17  0631   TSH 1.065       Significant Imaging: I have reviewed all pertinent imaging results/findings within the past 24 hours.    Assessment/Plan:      * Breakthrough seizure  - Received 1g Keppra load while in the ED  - Neurology Epilepsy following along  - 24H vEEG ordered, telemetry  - Seizure precautions, neuro checks Q4H  - UDS, TSH, HIV, RPR, ESR, CRP negative  - Ativan 2 mg PRN for seizure activity  - Will hold Keppra 1g PO BID while on continuous vEEG monitoring    ADHD (attention deficit  hyperactivity disorder)  - Will resume Vyvanse (home med) per Epilepsy  - Attempting to keep patient on home regimen where she is seizing while monitored by vEEG    DISPO: Pending vEEG monitoring with read per Epilepsy and follow up in Neurology clinic as outpatient.     VTE Risk Mitigation         Ordered     enoxaparin injection 40 mg  Daily     Route:  Subcutaneous        04/01/17 2145     Medium Risk of VTE  Once      04/01/17 2145            Chago Gan MD  Department of Hospital Medicine   [facility]

## 2017-04-04 LAB
ALBUMIN SERPL BCP-MCNC: 3.7 G/DL
ALP SERPL-CCNC: 68 U/L
ALT SERPL W/O P-5'-P-CCNC: 14 U/L
ANION GAP SERPL CALC-SCNC: 9 MMOL/L
AST SERPL-CCNC: 20 U/L
BILIRUB SERPL-MCNC: 0.3 MG/DL
BUN SERPL-MCNC: 9 MG/DL
CALCIUM SERPL-MCNC: 9.4 MG/DL
CHLORIDE SERPL-SCNC: 107 MMOL/L
CO2 SERPL-SCNC: 23 MMOL/L
CREAT SERPL-MCNC: 0.7 MG/DL
EST. GFR  (AFRICAN AMERICAN): >60 ML/MIN/1.73 M^2
EST. GFR  (NON AFRICAN AMERICAN): >60 ML/MIN/1.73 M^2
GLUCOSE SERPL-MCNC: 85 MG/DL
LEVETIRACETAM SERPL-MCNC: 17.8 UG/ML (ref 3–60)
MAGNESIUM SERPL-MCNC: 2.2 MG/DL
PHOSPHATE SERPL-MCNC: 4.3 MG/DL
POTASSIUM SERPL-SCNC: 3.6 MMOL/L
PROT SERPL-MCNC: 7.3 G/DL
SODIUM SERPL-SCNC: 139 MMOL/L

## 2017-04-04 PROCEDURE — 80053 COMPREHEN METABOLIC PANEL: CPT

## 2017-04-04 PROCEDURE — 36415 COLL VENOUS BLD VENIPUNCTURE: CPT

## 2017-04-04 PROCEDURE — 25000003 PHARM REV CODE 250: Performed by: PHYSICIAN ASSISTANT

## 2017-04-04 PROCEDURE — 99233 SBSQ HOSP IP/OBS HIGH 50: CPT | Mod: ,,, | Performed by: PSYCHIATRY & NEUROLOGY

## 2017-04-04 PROCEDURE — 95951 HC EEG MONITORING/VIDEO RECORD: CPT

## 2017-04-04 PROCEDURE — 95957 EEG DIGITAL ANALYSIS: CPT

## 2017-04-04 PROCEDURE — 20600001 HC STEP DOWN PRIVATE ROOM

## 2017-04-04 PROCEDURE — 95951 PR EEG MONITORING/VIDEORECORD: CPT | Mod: 26,,, | Performed by: PSYCHIATRY & NEUROLOGY

## 2017-04-04 PROCEDURE — 83735 ASSAY OF MAGNESIUM: CPT

## 2017-04-04 PROCEDURE — 84100 ASSAY OF PHOSPHORUS: CPT

## 2017-04-04 PROCEDURE — 63600175 PHARM REV CODE 636 W HCPCS: Performed by: HOSPITALIST

## 2017-04-04 PROCEDURE — 99231 SBSQ HOSP IP/OBS SF/LOW 25: CPT | Mod: ,,, | Performed by: HOSPITALIST

## 2017-04-04 RX ORDER — LISDEXAMFETAMINE DIMESYLATE 50 MG/1
50 CAPSULE ORAL DAILY
Status: DISCONTINUED | OUTPATIENT
Start: 2017-04-05 | End: 2017-04-05 | Stop reason: HOSPADM

## 2017-04-04 RX ORDER — LAMOTRIGINE 25 MG/1
25 TABLET ORAL DAILY
Status: DISCONTINUED | OUTPATIENT
Start: 2017-04-04 | End: 2017-04-05 | Stop reason: HOSPADM

## 2017-04-04 RX ADMIN — LAMOTRIGINE 25 MG: 25 TABLET ORAL at 11:04

## 2017-04-04 RX ADMIN — ENOXAPARIN SODIUM 40 MG: 100 INJECTION SUBCUTANEOUS at 05:04

## 2017-04-04 NOTE — CONSULTS
[facility]  Epilepsy Consult Note  Name: Bhavna Han  MRN: [id number]   CSN: [id number]      Date: 04/04/2017    SUBJECTIVE:   Interval History:  No acute events overnight. Patient completed sleep deprivation.     History of Present Illness:  The patient is a 22 y.o. yo RHWF   The patient was initially referred for consultation by Dr. Millan.   The patient was accompanied by her mother who provided some of the history.      Patient presented to ED on 4/1/17. She reports that she had a seizure while taking a shower. States that her roommate heard her yell and hit the ground, when she walked into the bathroom she had whole body stiffening and shaking. Patient reports that she has had 2 seizures in addition to the one on 4/1/17. The first in February that was attributed to drinking and THC use, the second was in March and she was started on Keppra 500 mg BID after that one. Patient does reports that prior to the seizure on Saturday that she had missed 2 night time doses of keppra. Reports with all 3 seizures that she was sleep deprived. Has a history of zoning out spells in school as a child. No further seizures since being admitted.     Epilepsy History (Per Dr. Rawls's Clinic Note):  1st seizure on 02/02/2017:  Patient states that she was out the night prior the seizure, was up until ~ 2:30am and admits to having etoh (admits to be being drunk) and smoking marijuana. She was on antibiotics for UTI at the time as well.  Reports waking up feeling fine (denies any hangover) and ~09:00 she was walking to her dorm followed by LOC. Her next recollection was waking up on the ground with people around her.  She reportedly fell to the ground and had generalized shaking with her eyes rolled up and frothing at the mouth - lasted ~ 2-3 min. Patient reports associated tongue bit but No hx of bladder/bowel incontinence. Reports brief (few min) post-ictal confusion.  EMS was called and she was evaluated at [facility]:  Labs showed methamphetamine and marijuana positive; CT head was negative, and no neurological deficits. Not started on any meds.     No further episodes.    Seizure Seminology  Seizure Type 1  Classification:   Aura -   Ictus  - Nonconv -  - Conv - whole body stiffening and jerking, +tongue biting   - Duration - 2-3 minutes   Post-ictal  - Symptoms - confused  - Duration  Age of onset - 22  Current Seizure Frequency -  3 total   Last Seizure - 4/1/17    Seizure Triggers  Sleep Deprivation - Yes  Other medications - None  Psych/stress - None  Photic stimulation - None  Hyperventilation - None  Medical Problems - None  Menses - No  Sensory Stimulation (light, sound, etc) - None  Missed dose of meds - Yes    AED Treatments  Present regimen  Keppra 500 mg BID    Prior treatments  None    Not tried  acetazolamide (Diamox, AZM)  amantadine  carbamazepine (Tegretol, CBZ)  clobezam (Onfi or Frizium, CLB)  ethosuximide (Zarontin, ESM)  eslicarbazine (Aptiom, ESL)  felbamate (felbatol, FBM)  gabapentin (Neurontin, GPN)  lacosamide (Vimpat, LCS)   lamotrigine (Lamictal, LTG)   methsuximide (Celontin, MSM)  methyphenytoin (Mesantion, MHT)  oxcarbazepine (Trileptal OXC)  perampanel (Fycompa, FCP)   phenobarbital (Pb)  phenytoin (Dilantin, PHT)  pregabalin (Lyrica, PGB)  primidone (Mysoline, PRM)  retigabine (Potiga, RTG)  rufinamide (Banzel, RUF)  tiagabine (Gabatril,  TGB)  topiramate (Topamax, TPM)  viagabatrin, (Sabril, VGB)  vagal nerve stimulator (VNS)  valproic acid (Depakote, VPA)  zonisamide (Zonegran, ZNA)  Benzodiazepines  diazepam - rectal (Diastatl)  diazepam - oral (Valium, DZ)  clonazepam (Klonopin, CZP)  clorazepate (Tranxene, CLZ)  Ativan  Brain Stimulation  Vagal Nerve Stimulation-n/a  DBS- n/a    Compliance method  Memory - yes  Mom or Spouse - Yes  Pill Box - no  Ian calendar - no  Turn over medication bottle - no  Phone alarm - no    Seizure Evaluation  EEG Routine -   3/13/17  INTERPRETATION: Normal, awake  and asleep EEG  EEG Ambulatory -   EEG\Video Monitoring -   MRI/MRA -   3/1/17  Unremarkable noncontrast MRI of the brain as detailed above specifically without focal parenchymal signal abnormality or acute infarction.  CT/CTA Scan -  4/1/17  Normal Head CT.   PET Scan -   Neuropsychological evaluation -   DEXA Scan    Potential Epilepsy Risk Factors:   Pregnancy/Labor/Delivery - full term uncomplicated pregnancy labor and vaginal delivery  Febrile seizures - No  - but reports one episode at 18mo of age when she 'went limp' - Associated with dehydration. No convulsive event noted. Was evaluated at the time.  Head injury  - none  CNS infection - none     Stroke - none  Family Hx of Sz - none    Review of Systems:  Constitutional: no fever or chills  Eyes: no visual changes  ENT: no nasal congestion or sore throat  Respiratory: no cough or shortness of breath  Cardiovascular: no chest pain or palpitations  Neurological: negative for dizziness, headaches, seizures, tremors and weakness    PAST MEDICAL HISTORY:   Active Ambulatory Problems     Diagnosis Date Noted    Seizure      Resolved Ambulatory Problems     Diagnosis Date Noted    No Resolved Ambulatory Problems     No Additional Past Medical History        PAST SURGICAL HISTORY: History reviewed. No pertinent surgical history.     FAMILY HISTORY: History reviewed. No pertinent family history.      SOCIAL HISTORY:   Social History     Social History    Marital status: Single     Spouse name: N/A    Number of children: N/A    Years of education: N/A     Occupational History    Not on file.     Social History Main Topics    Smoking status: Never Smoker    Smokeless tobacco: Not on file    Alcohol use Not on file    Drug use: Not on file    Sexual activity: Not on file     Other Topics Concern    Not on file     Social History Narrative        SUBSTANCE USE:  Social History     Social History Main Topics    Smoking status: Never Smoker    Smokeless  tobacco: Not on file    Alcohol use Not on file    Drug use: Not on file    Sexual activity: Not on file      Social History   Substance Use Topics    Smoking status: Never Smoker    Smokeless tobacco: Not on file    Alcohol use Not on file        ALLERGIES: Review of patient's allergies indicates no known allergies.       OBJECTIVE:     Vital Signs (Most Recent):  Temp: 98.5 °F (36.9 °C) (04/04/17 0730)  Pulse: 70 (04/04/17 0730)  Resp: 16 (04/04/17 0730)  BP: (!) 111/56 (04/04/17 0730)  SpO2: 98 % (04/04/17 0730)    Physical Exam:  General: well developed, well nourished  Eyes: conjunctivae/corneas clear. PERRL..  HENT: Head:normocephalic, atraumatic. Ears:right ear normal, left ear normal. Nose: no discharge. Throat: lips, mucosa, and tongue normal; teeth and gums normal.  Lungs:  normal respiratory effort  Cardiovascular: Heart: regular rate and rhythm. Chest Wall: not examined. Extremities: no cyanosis or edema, or clubbing. Pulses: not examined.    Higher Cortical Function:    Patient is a well developed, pleasant, well groomed individual appearing their stated age  Oriented - intact to person, place and time and followed two step instruction correctly.    Memory - Intact  Fund of knowledge was appropriate.    R-L Orientation - Intact -  Language - Speech was fluent without evidence for an aphasia.    Cranial Nerves II - XII:    EOMs were intact with normal smooth and no nystagmus.    PERRLA.  Visual fields were full to confrontation.    Motor - facial movement was symmetrical and normal.    Facial sensory - Light touch sensations were normal.    Hearing was normal to finger rub.  Palate moved well and was symmetrical with normal palatal and oral sensation.    Tongue movement was full. Normal power and bulk was found in the massiter and rotator muscles of the neck.  Motor: Power, bulk and tone were normal in all extremities.  Sensory: Light touch and position senses were normal in all extremities.     Coordination:       Rapid alternating movements and rapid finger tapping - normal.       Finger to nose - nl.    Gait:  Station, gait and tandem walking were done without difficulty and Romberg was negative.  Tremor: resting, postural, intentional - none    Laboratory:  CBC:     Recent Labs  Lab 04/01/17  1652   WBC 8.50   RBC 4.65   HGB 14.0   HCT 41.8      MCV 90   MCH 30.1   MCHC 33.5     CMP:     Recent Labs  Lab 04/04/17  0438   GLU 85   CALCIUM 9.4   ALBUMIN 3.7   PROT 7.3      K 3.6   CO2 23      BUN 9   CREATININE 0.7   ALKPHOS 68   ALT 14   AST 20   BILITOT 0.3       Diagnostic Results:  Labs: Reviewed  CT: Reviewed  MRI: Reviewed  EEG: Reviewed    ASSESSMENT/PLAN:     IMPRESSION:  1. Seizures - Primary generalized epilepsy   2. ADHD      vEEG (Reviewed with Dr. Bermudez):  4/3-4/4: spike wave discharges, no seizures      DISPOSITION:    1. Continue VEEG, can be d/c in AM  2. Hold Keppra   3. Seizure precautions  4. Activation procedures per protocol - Photic and 5 minutes of HV today    5. IV Valium PRN for GTC greater than 5 min - hospital medicine to call epilepsy on call before administering   6. Start Lamictal 25 mg daily      Discussed with Dr. Bermudez

## 2017-04-04 NOTE — MEDICAL/APP STUDENT
Progress Note  Hospital Medicine      Admit Date: 4/1/2017    SUBJECTIVE:     Follow-up For:  Breakthrough seizure    HPI:  Bhavna Han is a 22 y.o. female w/ a significant PMHx of seizures and ADHD who presents to [facility] ED for recurrent seizure activity. The patient was in the shower when the seizure occurred around 15:00. Her roommate noted her screaming and shaking. She was confused after the seizure for a few minutes. She denies loss of bowel or bladder function. She reports tongue injury. The episode lasted 5 minutes and she hit the back of her head. Does not take blood thinners. CT in the ED revealed no acute changes. She has had two seizures prior this. The first seizure occurred in February and the second occurred in March of 2017. She has been taking Keppra 500 mg PO BID since 03/16/17. She reports she has missed 1-2 PM doses. Pt does note that she smoke marijuana last night. She denies fever chills, N/V, diarrhea, abdominal pain, dysuria, numbness, tingling.      Hospital Course:  04/03/17: Patient admitted to hospital medicine for observation and 24H EEG monitoring. Neurology following along.    04/04/17: Patient was sleep deprived until 4 am and under 24H EEG monitoring with ADHD medication. Switched seizure medication to  Lamotrigine. Will continue to monitor       Interval History:  Patient offers no complaints at this time.      Review of Systems:  Pain Scale: 0 /10   Constitutional: no fever or chills  Respiratory: no cough or shortness of breath  Cardiovascular: no chest pain or palpitations  Gastrointestinal: no nausea or vomiting, no abdominal pain or change in bowel habits  Genitourinary: no hematuria or dysuria  Integument/Breast: no rash or pruritis  Hematologic/Lymphatic: no easy bruising or lymphadenopathy  Musculoskeletal: no arthralgias or myalgias  Neurological: no seizures or tremors  Behavioral/Psych: no depression or anxiety    OBJECTIVE:     Vital Signs Range (Last 24H):  Temp:   [98 °F (36.7 °C)-98.9 °F (37.2 °C)]   Pulse:  []   Resp:  [14-20]   BP: (101-176)/(51-74)   SpO2:  [97 %-99 %]     I & O (Last 24H):  Intake/Output Summary (Last 24 hours) at 04/04/17 0720  Last data filed at 04/04/17 0320   Gross per 24 hour   Intake              350 ml   Output                0 ml   Net              350 ml       Physical Exam:  General appearance: no distress,  Mental status: Alert and oriented x 3  HEENT:  conjunctivae/corneas clear, PERRL  Neck: supple, thyroid not enlarged  Pulm:   normal respiratory effort, CTA B, no c/w/r  Card: RRR, S1, S2 normal, no murmur, click, rub or gallop  Abd: soft, NT, ND, BS present; no masses, no organomegaly  Ext: no c/c/e  Pulses: 2+, symmetric  Skin: color, texture, turgor normal. No rashes or lesions  Neuro: CN II-XII grossly intact, no focal numbness or weakness, normal strength and tone       Diagnostic Results:  Labs reviewed    Recent Results (from the past 24 hour(s))   Comprehensive Metabolic Panel (CMP)    Collection Time: 04/04/17  4:38 AM   Result Value Ref Range    Sodium 139 136 - 145 mmol/L    Potassium 3.6 3.5 - 5.1 mmol/L    Chloride 107 95 - 110 mmol/L    CO2 23 23 - 29 mmol/L    Glucose 85 70 - 110 mg/dL    BUN, Bld 9 6 - 20 mg/dL    Creatinine 0.7 0.5 - 1.4 mg/dL    Calcium 9.4 8.7 - 10.5 mg/dL    Total Protein 7.3 6.0 - 8.4 g/dL    Albumin 3.7 3.5 - 5.2 g/dL    Total Bilirubin 0.3 0.1 - 1.0 mg/dL    Alkaline Phosphatase 68 55 - 135 U/L    AST 20 10 - 40 U/L    ALT 14 10 - 44 U/L    Anion Gap 9 8 - 16 mmol/L    eGFR if African American >60.0 >60 mL/min/1.73 m^2    eGFR if non African American >60.0 >60 mL/min/1.73 m^2   Magnesium    Collection Time: 04/04/17  4:38 AM   Result Value Ref Range    Magnesium 2.2 1.6 - 2.6 mg/dL   Phosphorus    Collection Time: 04/04/17  4:38 AM   Result Value Ref Range    Phosphorus 4.3 2.7 - 4.5 mg/dL           ASSESSMENT/PLAN:     Bhavnaniurka Han is a 22 y.o. female w/ a significant PMHx of seizures and  ADHD who presents to [facility] ED for recurrent seizure activity.      Problems  1) Breakthrough Seizure   - Neurology and Epilepsy consulted   - Patient monitored on EEG without medications previously and then with ADHD medications.   - Patient now being monitored after starting on lamogtrigine.    - Patient's mother was informed of plan   - Possible Discharge tomorrow and follow up with outpatient services    2) ADHD   - resumed Vyvanse   - monitoring seizures      Romy Morataya  Medical Student

## 2017-04-04 NOTE — ASSESSMENT & PLAN NOTE
- Received 1g Keppra load while in the ED  - Neurology Epilepsy following along  - 24H vEEG ordered, telemetry  - Seizure precautions, neuro checks Q4H  - UDS, TSH, HIV, RPR, ESR, CRP negative  - Ativan 2 mg PRN for seizure activity  - Will hold Keppra 1g PO BID while on continuous vEEG monitoring  - Lamictal added today by Neuro, will continue to monitor

## 2017-04-04 NOTE — PROGRESS NOTES
[facility] Hospital Medicine  Progress Note    Patient Name: Bhavna Han  MRN: [id number]  Patient Class: IP- Inpatient   Admission Date: 4/1/2017  Length of Stay: 3 days  Attending Physician: Lukas Schuster MD  Primary Care Provider: [facility] Medicine Team: [facility] HOSP MED 1 Suleiman Bojorquez IV, MD    Subjective:     Principal Problem:Breakthrough seizure    HPI:  Bhavna Han is a 22 y.o. female w/ a significant PMHx of seizures and ADHD who presents to [facility] ED for recurrent seizure activity. The patient was in the shower when the seizure occurred around 15:00. Her roommate noted her screaming and shaking. She was confused after the seizure for a few minutes. She denies loss of bowel or bladder function. She reports tongue injury. The episode lasted 5 minutes and she hit the back of her head. Does not take blood thinners. CT in the ED revealed no acute changes. She has had two seizures prior this. The first seizure occurred in February and the second occurred in March of 2017. She has been taking Keppra 500 mg PO BID since 03/16/17. She reports she has missed 1-2 PM doses.  Pt does note that she smoke marijuana last night. She denies fever chills, N/V, diarrhea, abdominal pain, dysuria, numbness, tingling.      Hospital Course:  04/03/17: Patient admitted to hospital medicine for observation and 24H EEG monitoring. Neurology following along.     Interval History: No acute events. Ms. Han reports no seizure last night after sleep deprivation. No complaints this AM    Review of Systems   Constitutional: Negative for chills and fever.   HENT: Negative for congestion and rhinorrhea.    Eyes: Negative for discharge and redness.   Respiratory: Negative for cough and shortness of breath.    Cardiovascular: Negative for chest pain and palpitations.   Gastrointestinal: Negative for abdominal pain, nausea and vomiting.   Genitourinary: Negative for dysuria and urgency.    Musculoskeletal: Negative for back pain and myalgias.   Skin: Negative for rash and wound.   Neurological: Negative for dizziness, seizures and headaches.   Psychiatric/Behavioral: Negative for agitation and confusion.     Objective:     Vital Signs (Most Recent):  Temp: 98.5 °F (36.9 °C) (04/04/17 0730)  Pulse: 87 (04/04/17 1100)  Resp: 16 (04/04/17 0730)  BP: (!) 111/56 (04/04/17 0730)  SpO2: 98 % (04/04/17 0730) Vital Signs (24h Range):  Temp:  [98 °F (36.7 °C)-98.8 °F (37.1 °C)] 98.5 °F (36.9 °C)  Pulse:  [] 87  Resp:  [14-20] 16  SpO2:  [97 %-99 %] 98 %  BP: (111-176)/(56-74) 111/56     Weight: 42.2 kg (93 lb)  Body mass index is 18.78 kg/(m^2).    Intake/Output Summary (Last 24 hours) at 04/04/17 1353  Last data filed at 04/04/17 0320   Gross per 24 hour   Intake              350 ml   Output                0 ml   Net              350 ml      Physical Exam   Constitutional: She is oriented to person, place, and time. She appears well-developed and well-nourished. No distress.   HENT:   Head: Normocephalic and atraumatic.   Eyes: EOM are normal. Pupils are equal, round, and reactive to light.   Neck: Normal range of motion. Neck supple.   Cardiovascular: Normal rate and regular rhythm.    No murmur heard.  Pulmonary/Chest: Effort normal. No respiratory distress.   Abdominal: Soft. She exhibits no distension.   Neurological: She is alert and oriented to person, place, and time. No cranial nerve deficit.   Skin: No rash noted. She is not diaphoretic. No pallor.   Psychiatric: She has a normal mood and affect. Her behavior is normal.       Significant Labs:   CBC: No results for input(s): WBC, HGB, HCT, PLT in the last 48 hours.  CMP:   Recent Labs  Lab 04/03/17  0352 04/04/17  0438    139   K 3.6 3.6    107   CO2 30* 23   GLU 87 85   BUN 11 9   CREATININE 0.8 0.7   CALCIUM 9.2 9.4   PROT 6.7 7.3   ALBUMIN 3.4* 3.7   BILITOT 0.2 0.3   ALKPHOS 69 68   AST 16 20   ALT 13 14   ANIONGAP 9 9    EGFRNONAA >60.0 >60.0       Significant Imaging: I have reviewed all pertinent imaging results/findings within the past 24 hours.    Assessment/Plan:      * Breakthrough seizure  - Received 1g Keppra load while in the ED  - Neurology Epilepsy following along  - 24H vEEG ordered, telemetry  - Seizure precautions, neuro checks Q4H  - UDS, TSH, HIV, RPR, ESR, CRP negative  - Ativan 2 mg PRN for seizure activity  - Will hold Keppra 1g PO BID while on continuous vEEG monitoring  - Lamictal added today by Neuro, will continue to monitor    ADHD (attention deficit hyperactivity disorder)  - Will resume Vyvanse (home med) per Epilepsy  - Attempting to keep patient on home regimen where she is seizing while monitored by vEEG    VTE Risk Mitigation         Ordered     enoxaparin injection 40 mg  Daily     Route:  Subcutaneous        04/01/17 2145     Medium Risk of VTE  Once      04/01/17 2145          Suleiman Bojorquez IV, MD  Department of Hospital Medicine   [facility]

## 2017-04-04 NOTE — PLAN OF CARE
Problem: Patient Care Overview  Goal: Plan of Care Review  Plan of care reviewed with patient and mother.  Verbalized understanding.  Patient is alert and oriented time 4.  Patient is sleep deprive tonight. VSS. Patient is awake until 4. Will continue to monitor.

## 2017-04-04 NOTE — SUBJECTIVE & OBJECTIVE
Interval History: No acute events. Ms. Han reports no seizure last night after sleep deprivation. No complaints this AM    Review of Systems   Constitutional: Negative for chills and fever.   HENT: Negative for congestion and rhinorrhea.    Eyes: Negative for discharge and redness.   Respiratory: Negative for cough and shortness of breath.    Cardiovascular: Negative for chest pain and palpitations.   Gastrointestinal: Negative for abdominal pain, nausea and vomiting.   Genitourinary: Negative for dysuria and urgency.   Musculoskeletal: Negative for back pain and myalgias.   Skin: Negative for rash and wound.   Neurological: Negative for dizziness, seizures and headaches.   Psychiatric/Behavioral: Negative for agitation and confusion.     Objective:     Vital Signs (Most Recent):  Temp: 98.5 °F (36.9 °C) (04/04/17 0730)  Pulse: 87 (04/04/17 1100)  Resp: 16 (04/04/17 0730)  BP: (!) 111/56 (04/04/17 0730)  SpO2: 98 % (04/04/17 0730) Vital Signs (24h Range):  Temp:  [98 °F (36.7 °C)-98.8 °F (37.1 °C)] 98.5 °F (36.9 °C)  Pulse:  [] 87  Resp:  [14-20] 16  SpO2:  [97 %-99 %] 98 %  BP: (111-176)/(56-74) 111/56     Weight: 42.2 kg (93 lb)  Body mass index is 18.78 kg/(m^2).    Intake/Output Summary (Last 24 hours) at 04/04/17 1353  Last data filed at 04/04/17 0320   Gross per 24 hour   Intake              350 ml   Output                0 ml   Net              350 ml      Physical Exam   Constitutional: She is oriented to person, place, and time. She appears well-developed and well-nourished. No distress.   HENT:   Head: Normocephalic and atraumatic.   Eyes: EOM are normal. Pupils are equal, round, and reactive to light.   Neck: Normal range of motion. Neck supple.   Cardiovascular: Normal rate and regular rhythm.    No murmur heard.  Pulmonary/Chest: Effort normal. No respiratory distress.   Abdominal: Soft. She exhibits no distension.   Neurological: She is alert and oriented to person, place, and time. No cranial  nerve deficit.   Skin: No rash noted. She is not diaphoretic. No pallor.   Psychiatric: She has a normal mood and affect. Her behavior is normal.       Significant Labs:   CBC: No results for input(s): WBC, HGB, HCT, PLT in the last 48 hours.  CMP:   Recent Labs  Lab 04/03/17  0352 04/04/17  0438    139   K 3.6 3.6    107   CO2 30* 23   GLU 87 85   BUN 11 9   CREATININE 0.8 0.7   CALCIUM 9.2 9.4   PROT 6.7 7.3   ALBUMIN 3.4* 3.7   BILITOT 0.2 0.3   ALKPHOS 69 68   AST 16 20   ALT 13 14   ANIONGAP 9 9   EGFRNONAA >60.0 >60.0       Significant Imaging: I have reviewed all pertinent imaging results/findings within the past 24 hours.

## 2017-04-05 VITALS
BODY MASS INDEX: 18.75 KG/M2 | WEIGHT: 93 LBS | RESPIRATION RATE: 14 BRPM | OXYGEN SATURATION: 95 % | HEIGHT: 59 IN | DIASTOLIC BLOOD PRESSURE: 59 MMHG | HEART RATE: 80 BPM | TEMPERATURE: 98 F | SYSTOLIC BLOOD PRESSURE: 111 MMHG

## 2017-04-05 LAB
ALBUMIN SERPL BCP-MCNC: 3.9 G/DL
ALP SERPL-CCNC: 70 U/L
ALT SERPL W/O P-5'-P-CCNC: 50 U/L
ANION GAP SERPL CALC-SCNC: 8 MMOL/L
AST SERPL-CCNC: 56 U/L
BILIRUB SERPL-MCNC: 0.5 MG/DL
BUN SERPL-MCNC: 12 MG/DL
CALCIUM SERPL-MCNC: 9.7 MG/DL
CHLORIDE SERPL-SCNC: 106 MMOL/L
CO2 SERPL-SCNC: 26 MMOL/L
CREAT SERPL-MCNC: 0.8 MG/DL
EST. GFR  (AFRICAN AMERICAN): >60 ML/MIN/1.73 M^2
EST. GFR  (NON AFRICAN AMERICAN): >60 ML/MIN/1.73 M^2
GLUCOSE SERPL-MCNC: 81 MG/DL
MAGNESIUM SERPL-MCNC: 2.5 MG/DL
POTASSIUM SERPL-SCNC: 4.3 MMOL/L
PROT SERPL-MCNC: 7.5 G/DL
SODIUM SERPL-SCNC: 140 MMOL/L

## 2017-04-05 PROCEDURE — 36415 COLL VENOUS BLD VENIPUNCTURE: CPT

## 2017-04-05 PROCEDURE — 80053 COMPREHEN METABOLIC PANEL: CPT

## 2017-04-05 PROCEDURE — 83735 ASSAY OF MAGNESIUM: CPT

## 2017-04-05 PROCEDURE — 99238 HOSP IP/OBS DSCHRG MGMT 30/<: CPT | Mod: ,,, | Performed by: HOSPITALIST

## 2017-04-05 PROCEDURE — 25000003 PHARM REV CODE 250: Performed by: PHYSICIAN ASSISTANT

## 2017-04-05 RX ORDER — LAMOTRIGINE 25 MG/1
25 TABLET ORAL DAILY
Qty: 42 TABLET | Refills: 0 | Status: SHIPPED | OUTPATIENT
Start: 2017-04-05 | End: 2017-10-28

## 2017-04-05 RX ORDER — LAMOTRIGINE 100 MG/1
100 TABLET ORAL DAILY
Qty: 30 TABLET | Refills: 2 | Status: SHIPPED | OUTPATIENT
Start: 2017-04-26 | End: 2018-04-26

## 2017-04-05 RX ORDER — LAMOTRIGINE 25 MG/1
25 TABLET ORAL DAILY
Qty: 30 TABLET | Refills: 0 | Status: SHIPPED | OUTPATIENT
Start: 2017-04-05 | End: 2017-04-05

## 2017-04-05 RX ORDER — LAMOTRIGINE 100 MG/1
100 TABLET ORAL DAILY
Qty: 30 TABLET | Refills: 11 | OUTPATIENT
Start: 2017-04-05 | End: 2017-04-05

## 2017-04-05 RX ADMIN — LISDEXAMFETAMINE DIMESYLATE 50 MG: 50 CAPSULE ORAL at 08:04

## 2017-04-05 RX ADMIN — LAMOTRIGINE 25 MG: 25 TABLET ORAL at 08:04

## 2017-04-05 NOTE — DISCHARGE INSTRUCTIONS
Date Name Bhavna Han MRN [id number]      4/4/2017             Lamictal  Lamictal   Keppra     Dosage 25 mg  100 mg  500 mg     Yesterday 0   0   0  0   2017/4/4 1   0   1  1   2017/4/11 2   0   1  1   2017/4/18 3   0   1  1   2017/4/25 0   1   1  1   2017/5/2 1   1   1  1   2017/5/9 2   1   ½  ½   2017/5/16 3   1   0 stop 0   2017/5/23 0   2 stay on this dose

## 2017-04-05 NOTE — PROGRESS NOTES
[facility] Hospital Medicine  Progress Note    Patient Name: Bhavna Han  MRN: [id number]  Patient Class: IP- Inpatient   Admission Date: 4/1/2017  Length of Stay: 4 days  Attending Physician: Lukas Schuster MD  Primary Care Provider: [facility] Medicine Team: [facility] HOSP MED 1 Suleiman Bojorquez IV, MD    Subjective:     Principal Problem:Breakthrough seizure    HPI:  Bhavna Han is a 22 y.o. female w/ a significant PMHx of seizures and ADHD who presents to [facility] ED for recurrent seizure activity. The patient was in the shower when the seizure occurred around 15:00. Her roommate noted her screaming and shaking. She was confused after the seizure for a few minutes. She denies loss of bowel or bladder function. She reports tongue injury. The episode lasted 5 minutes and she hit the back of her head. Does not take blood thinners. CT in the ED revealed no acute changes. She has had two seizures prior this. The first seizure occurred in February and the second occurred in March of 2017. She has been taking Keppra 500 mg PO BID since 03/16/17. She reports she has missed 1-2 PM doses.  Pt does note that she smoke marijuana last night. She denies fever chills, N/V, diarrhea, abdominal pain, dysuria, numbness, tingling.      Hospital Course:  04/03/17: Patient admitted to hospital medicine for observation and 24H EEG monitoring. Neurology following along.     Interval History: No acute events. Started lamictal yesterday. No seizure activity. Reports she feels well.     Review of Systems   Constitutional: Negative for chills and fever.   HENT: Negative for congestion and rhinorrhea.    Eyes: Negative for discharge and redness.   Respiratory: Negative for cough and shortness of breath.    Cardiovascular: Negative for chest pain and palpitations.   Gastrointestinal: Negative for abdominal pain, nausea and vomiting.   Genitourinary: Negative for dysuria and urgency.   Musculoskeletal:  Negative for back pain and myalgias.   Skin: Negative for rash and wound.   Neurological: Negative for dizziness, seizures and headaches.   Psychiatric/Behavioral: Negative for agitation and confusion.     Objective:     Vital Signs (Most Recent):  Temp: 98.6 °F (37 °C) (04/05/17 0415)  Pulse: 68 (04/05/17 0700)  Resp: 14 (04/05/17 0415)  BP: 125/60 (04/05/17 0415)  SpO2: 96 % (04/05/17 0415) Vital Signs (24h Range):  Temp:  [98.3 °F (36.8 °C)-98.8 °F (37.1 °C)] 98.6 °F (37 °C)  Pulse:  [] 68  Resp:  [14-16] 14  SpO2:  [96 %-99 %] 96 %  BP: (114-126)/(60-78) 125/60     Weight: 42.2 kg (93 lb)  Body mass index is 18.78 kg/(m^2).    Intake/Output Summary (Last 24 hours) at 04/05/17 0908  Last data filed at 04/05/17 0415   Gross per 24 hour   Intake              610 ml   Output                0 ml   Net              610 ml      Physical Exam   Constitutional: She is oriented to person, place, and time. She appears well-developed and well-nourished. No distress.   HENT:   Head: Normocephalic and atraumatic.   Eyes: EOM are normal. Pupils are equal, round, and reactive to light.   Neck: Normal range of motion. Neck supple.   Cardiovascular: Normal rate and regular rhythm.    No murmur heard.  Pulmonary/Chest: Effort normal. No respiratory distress.   Abdominal: Soft. She exhibits no distension.   Neurological: She is alert and oriented to person, place, and time. No cranial nerve deficit.   Skin: No rash noted. She is not diaphoretic. No pallor.   Psychiatric: She has a normal mood and affect. Her behavior is normal.       Significant Labs:   CBC: No results for input(s): WBC, HGB, HCT, PLT in the last 48 hours.  CMP:   Recent Labs  Lab 04/04/17  0438 04/05/17  0433    140   K 3.6 4.3    106   CO2 23 26   GLU 85 81   BUN 9 12   CREATININE 0.7 0.8   CALCIUM 9.4 9.7   PROT 7.3 7.5   ALBUMIN 3.7 3.9   BILITOT 0.3 0.5   ALKPHOS 68 70   AST 20 56*   ALT 14 50*   ANIONGAP 9 8   EGFRNONAA >60.0 >60.0        Significant Imaging: I have reviewed all pertinent imaging results/findings within the past 24 hours.    Assessment/Plan:      * Breakthrough seizure  - Received 1g Keppra load while in the ED  - Neurology Epilepsy following along  - 24H vEEG ordered, telemetry  - Seizure precautions, neuro checks Q4H  - UDS, TSH, HIV, RPR, ESR, CRP negative  - Ativan 2 mg PRN for seizure activity  - Per Neuro restart Keppra BID at discharge  - Lamictal added to Keppra, will tirate up at discharge    ADHD (attention deficit hyperactivity disorder)  - Will resume Vyvanse (home med) per Epilepsy  - Attempting to keep patient on home regimen where she is seizing while monitored by vEEG    VTE Risk Mitigation         Ordered     enoxaparin injection 40 mg  Daily     Route:  Subcutaneous        04/01/17 2145     Medium Risk of VTE  Once      04/01/17 2145          Suleiman Bojorquez IV, MD  Department of Hospital Medicine   [facility]

## 2017-04-05 NOTE — ASSESSMENT & PLAN NOTE
- Received 1g Keppra load while in the ED  - Neurology Epilepsy following along  - 24H vEEG ordered, telemetry  - Seizure precautions, neuro checks Q4H  - UDS, TSH, HIV, RPR, ESR, CRP negative  - Ativan 2 mg PRN for seizure activity  - Per Neuro restart Keppra BID at discharge  - Lamictal added to Keppra, will tirate up at discharge

## 2017-04-05 NOTE — PROGRESS NOTES
Saline lock dc'd. Telemetry monitor dc'd. Discharge instructions given to pt, mother at bedside. Pt escort requested via wheelchair.

## 2017-04-05 NOTE — ASSESSMENT & PLAN NOTE
- Neurology Epilepsy following along  - 24H vEEG ordered, telemetry  - Seizure precautions, neuro checks Q4H  - UDS, TSH, HIV, RPR, ESR, CRP negative  - Ativan 2 mg PRN for seizure activity, not required  - Per Neuro restart Keppra BID at discharge  - Lamictal added to Keppra, will tirate up at discharge per their schedule provided to patient

## 2017-04-05 NOTE — SUBJECTIVE & OBJECTIVE
Interval History: No acute events. Started lamictal yesterday. No seizure activity. Reports she feels well.     Review of Systems   Constitutional: Negative for chills and fever.   HENT: Negative for congestion and rhinorrhea.    Eyes: Negative for discharge and redness.   Respiratory: Negative for cough and shortness of breath.    Cardiovascular: Negative for chest pain and palpitations.   Gastrointestinal: Negative for abdominal pain, nausea and vomiting.   Genitourinary: Negative for dysuria and urgency.   Musculoskeletal: Negative for back pain and myalgias.   Skin: Negative for rash and wound.   Neurological: Negative for dizziness, seizures and headaches.   Psychiatric/Behavioral: Negative for agitation and confusion.     Objective:     Vital Signs (Most Recent):  Temp: 98.6 °F (37 °C) (04/05/17 0415)  Pulse: 68 (04/05/17 0700)  Resp: 14 (04/05/17 0415)  BP: 125/60 (04/05/17 0415)  SpO2: 96 % (04/05/17 0415) Vital Signs (24h Range):  Temp:  [98.3 °F (36.8 °C)-98.8 °F (37.1 °C)] 98.6 °F (37 °C)  Pulse:  [] 68  Resp:  [14-16] 14  SpO2:  [96 %-99 %] 96 %  BP: (114-126)/(60-78) 125/60     Weight: 42.2 kg (93 lb)  Body mass index is 18.78 kg/(m^2).    Intake/Output Summary (Last 24 hours) at 04/05/17 0908  Last data filed at 04/05/17 0415   Gross per 24 hour   Intake              610 ml   Output                0 ml   Net              610 ml      Physical Exam   Constitutional: She is oriented to person, place, and time. She appears well-developed and well-nourished. No distress.   HENT:   Head: Normocephalic and atraumatic.   Eyes: EOM are normal. Pupils are equal, round, and reactive to light.   Neck: Normal range of motion. Neck supple.   Cardiovascular: Normal rate and regular rhythm.    No murmur heard.  Pulmonary/Chest: Effort normal. No respiratory distress.   Abdominal: Soft. She exhibits no distension.   Neurological: She is alert and oriented to person, place, and time. No cranial nerve deficit.    Skin: No rash noted. She is not diaphoretic. No pallor.   Psychiatric: She has a normal mood and affect. Her behavior is normal.       Significant Labs:   CBC: No results for input(s): WBC, HGB, HCT, PLT in the last 48 hours.  CMP:   Recent Labs  Lab 04/04/17  0438 04/05/17  0433    140   K 3.6 4.3    106   CO2 23 26   GLU 85 81   BUN 9 12   CREATININE 0.7 0.8   CALCIUM 9.4 9.7   PROT 7.3 7.5   ALBUMIN 3.7 3.9   BILITOT 0.3 0.5   ALKPHOS 68 70   AST 20 56*   ALT 14 50*   ANIONGAP 9 8   EGFRNONAA >60.0 >60.0       Significant Imaging: I have reviewed all pertinent imaging results/findings within the past 24 hours.

## 2017-04-05 NOTE — PLAN OF CARE
Problem: Patient Care Overview  Goal: Plan of Care Review  Plan of care reviewed with patient and mother.  Verbalized understanding.  Patient is alert and oriented time 4.  No acute neuro changes noticed throughout the night.  VSS. Resting comfortably in bed.  Will continue to monitor.

## 2017-04-05 NOTE — CONSULTS
[facility]  Epilepsy Consult Note  Name: Bhavna Han  MRN: [id number]   CSN: [id number]      Date: 04/05/2017    SUBJECTIVE:   Interval History:  No acute events overnight.     History of Present Illness:  The patient is a 22 y.o. yo RHWF   The patient was initially referred for consultation by Dr. Millan.   The patient was accompanied by her mother who provided some of the history.      Patient presented to ED on 4/1/17. She reports that she had a seizure while taking a shower. States that her roommate heard her yell and hit the ground, when she walked into the bathroom she had whole body stiffening and shaking. Patient reports that she has had 2 seizures in addition to the one on 4/1/17. The first in February that was attributed to drinking and THC use, the second was in March and she was started on Keppra 500 mg BID after that one. Patient does reports that prior to the seizure on Saturday that she had missed 2 night time doses of keppra. Reports with all 3 seizures that she was sleep deprived. Has a history of zoning out spells in school as a child. No further seizures since being admitted.     Epilepsy History (Per Dr. Rawls's Clinic Note):  1st seizure on 02/02/2017:  Patient states that she was out the night prior the seizure, was up until ~ 2:30am and admits to having etoh (admits to be being drunk) and smoking marijuana. She was on antibiotics for UTI at the time as well.  Reports waking up feeling fine (denies any hangover) and ~09:00 she was walking to her dorm followed by LOC. Her next recollection was waking up on the ground with people around her.  She reportedly fell to the ground and had generalized shaking with her eyes rolled up and frothing at the mouth - lasted ~ 2-3 min. Patient reports associated tongue bit but No hx of bladder/bowel incontinence. Reports brief (few min) post-ictal confusion.  EMS was called and she was evaluated at [facility]: Labs showed methamphetamine and  marijuana positive; CT head was negative, and no neurological deficits. Not started on any meds.     No further episodes.    Seizure Seminology  Seizure Type 1  Classification:   Aura -   Ictus  - Nonconv -  - Conv - whole body stiffening and jerking, +tongue biting   - Duration - 2-3 minutes   Post-ictal  - Symptoms - confused  - Duration  Age of onset - 22  Current Seizure Frequency -  3 total   Last Seizure - 4/1/17    Seizure Triggers  Sleep Deprivation - Yes  Other medications - None  Psych/stress - None  Photic stimulation - None  Hyperventilation - None  Medical Problems - None  Menses - No  Sensory Stimulation (light, sound, etc) - None  Missed dose of meds - Yes    AED Treatments  Present regimen  Keppra 500 mg BID    Prior treatments  None    Not tried  acetazolamide (Diamox, AZM)  amantadine  carbamazepine (Tegretol, CBZ)  clobezam (Onfi or Frizium, CLB)  ethosuximide (Zarontin, ESM)  eslicarbazine (Aptiom, ESL)  felbamate (felbatol, FBM)  gabapentin (Neurontin, GPN)  lacosamide (Vimpat, LCS)   lamotrigine (Lamictal, LTG)   methsuximide (Celontin, MSM)  methyphenytoin (Mesantion, MHT)  oxcarbazepine (Trileptal OXC)  perampanel (Fycompa, FCP)   phenobarbital (Pb)  phenytoin (Dilantin, PHT)  pregabalin (Lyrica, PGB)  primidone (Mysoline, PRM)  retigabine (Potiga, RTG)  rufinamide (Banzel, RUF)  tiagabine (Gabatril,  TGB)  topiramate (Topamax, TPM)  viagabatrin, (Sabril, VGB)  vagal nerve stimulator (VNS)  valproic acid (Depakote, VPA)  zonisamide (Zonegran, ZNA)  Benzodiazepines  diazepam - rectal (Diastatl)  diazepam - oral (Valium, DZ)  clonazepam (Klonopin, CZP)  clorazepate (Tranxene, CLZ)  Ativan  Brain Stimulation  Vagal Nerve Stimulation-n/a  DBS- n/a    Compliance method  Memory - yes  Mom or Spouse - Yes  Pill Box - no  Ian calendar - no  Turn over medication bottle - no  Phone alarm - no    Seizure Evaluation  EEG Routine -   3/13/17  INTERPRETATION: Normal, awake and asleep EEG  EEG Ambulatory -    EEG\Video Monitoring -   MRI/MRA -   3/1/17  Unremarkable noncontrast MRI of the brain as detailed above specifically without focal parenchymal signal abnormality or acute infarction.  CT/CTA Scan -  4/1/17  Normal Head CT.   PET Scan -   Neuropsychological evaluation -   DEXA Scan    Potential Epilepsy Risk Factors:   Pregnancy/Labor/Delivery - full term uncomplicated pregnancy labor and vaginal delivery  Febrile seizures - No  - but reports one episode at 18mo of age when she 'went limp' - Associated with dehydration. No convulsive event noted. Was evaluated at the time.  Head injury  - none  CNS infection - none     Stroke - none  Family Hx of Sz - none    Review of Systems:  Constitutional: no fever or chills  Eyes: no visual changes  ENT: no nasal congestion or sore throat  Respiratory: no cough or shortness of breath  Cardiovascular: no chest pain or palpitations  Neurological: negative for dizziness, headaches, seizures, tremors and weakness    PAST MEDICAL HISTORY:   Active Ambulatory Problems     Diagnosis Date Noted    Seizure      Resolved Ambulatory Problems     Diagnosis Date Noted    No Resolved Ambulatory Problems     No Additional Past Medical History        PAST SURGICAL HISTORY: History reviewed. No pertinent surgical history.     FAMILY HISTORY: History reviewed. No pertinent family history.      SOCIAL HISTORY:   Social History     Social History    Marital status: Single     Spouse name: N/A    Number of children: N/A    Years of education: N/A     Occupational History    Not on file.     Social History Main Topics    Smoking status: Never Smoker    Smokeless tobacco: Not on file    Alcohol use Not on file    Drug use: Not on file    Sexual activity: Not on file     Other Topics Concern    Not on file     Social History Narrative        SUBSTANCE USE:  Social History     Social History Main Topics    Smoking status: Never Smoker    Smokeless tobacco: Not on file    Alcohol use  Not on file    Drug use: Not on file    Sexual activity: Not on file      Social History   Substance Use Topics    Smoking status: Never Smoker    Smokeless tobacco: Not on file    Alcohol use Not on file        ALLERGIES: Tramadol       OBJECTIVE:     Vital Signs (Most Recent):  Temp: 98.6 °F (37 °C) (04/05/17 0415)  Pulse: 68 (04/05/17 0700)  Resp: 14 (04/05/17 0415)  BP: 125/60 (04/05/17 0415)  SpO2: 96 % (04/05/17 0415)    Physical Exam:  General: well developed, well nourished  Eyes: conjunctivae/corneas clear. PERRL..  HENT: Head:normocephalic, atraumatic. Ears:right ear normal, left ear normal. Nose: no discharge. Throat: lips, mucosa, and tongue normal; teeth and gums normal.  Lungs:  normal respiratory effort  Cardiovascular: Heart: regular rate and rhythm. Chest Wall: not examined. Extremities: no cyanosis or edema, or clubbing. Pulses: not examined.    Higher Cortical Function:    Patient is a well developed, pleasant, well groomed individual appearing their stated age  Oriented - intact to person, place and time and followed two step instruction correctly.    Memory - Intact  Fund of knowledge was appropriate.    R-L Orientation - Intact -  Language - Speech was fluent without evidence for an aphasia.    Cranial Nerves II - XII:    EOMs were intact with normal smooth and no nystagmus.    PERRLA.  Visual fields were full to confrontation.    Motor - facial movement was symmetrical and normal.    Facial sensory - Light touch sensations were normal.    Hearing was normal to finger rub.  Palate moved well and was symmetrical with normal palatal and oral sensation.    Tongue movement was full. Normal power and bulk was found in the massiter and rotator muscles of the neck.  Motor: Power, bulk and tone were normal in all extremities.  Sensory: Light touch and position senses were normal in all extremities.    Coordination:       Rapid alternating movements and rapid finger tapping - normal.       Finger  to nose - nl.    Gait:  Station, gait and tandem walking were done without difficulty and Romberg was negative.  Tremor: resting, postural, intentional - none    Laboratory:  CBC:     Recent Labs  Lab 04/01/17  1652   WBC 8.50   RBC 4.65   HGB 14.0   HCT 41.8      MCV 90   MCH 30.1   MCHC 33.5     CMP:     Recent Labs  Lab 04/05/17  0433   GLU 81   CALCIUM 9.7   ALBUMIN 3.9   PROT 7.5      K 4.3   CO2 26      BUN 12   CREATININE 0.8   ALKPHOS 70   ALT 50*   AST 56*   BILITOT 0.5       Diagnostic Results:  Labs: Reviewed  CT: Reviewed  MRI: Reviewed  EEG: Reviewed    ASSESSMENT/PLAN:     IMPRESSION:  1. Seizures - Primary generalized epilepsy   2. ADHD      vEEG (Reviewed with Dr. Bermudez):  4/3-4/4: spike wave discharges, no seizures   4/4-4/5: spike wave discharges, no seizures     DISPOSITION:    1. D/c VEEG  2. Restart Keppra 500 mg BID  3. Seizure precautions  4. Continue Lamictal 25 mg daily, with plan to increase by 25 mg a week until target dose of 200 mg daily is reached (patient given taper schedule)  5. F/u with Dr. Rawls in May   6. Patient okay to be discharged from epilepsy standpoint       Discussed with Dr. Bermudez

## 2017-04-05 NOTE — PROCEDURES
DATE OF STUDY:  04/05/2017    EEG NUMBER:  [id number]    REQUESTED BY:  Neo Powers M.D.    LOCATION OF SERVICE:  4.    ICU EEG AND VIDEO MONITORING REPORT    METHODOLOGY:  Electroencephalographic (EEG) is recorded with electrodes placed   according to the International 10-20 placement system.  Thirty two (32) channels   of digital signal (sampling rate of 512/sec), including T1 and T2, were   simultaneously recorded from the scalp and may include EKG, EMG, and/or eye   monitors.  Recording band pass was 0.1 to 512 Hz.  Digital video recording of   the patient is simultaneously recorded with the EEG.  The patient is instructed   to report clinical symptoms which may occur during the recording session.  EEG   and video recording are stored and archived in digital format.  Activation   procedures, which include photic stimulation, hyperventilation and instructing   patients to perform simple tasks, are done in selected patients.    The EEG is displayed on a monitor screen and can be reviewed using different   montages.  Computer-assisted analysis is employed to detect spike and   electrographic seizure activity.  The entire record is submitted for computer   analysis.  The entire recording is visually reviewed, and the times identified   by computer analysis as being spikes or seizures are reviewed again.    Compressed spectral analysis (CSA) is also performed on the activity recorded   from each individual channel.  This is displayed as a power display of   frequencies from 0 to 30 Hz over time.  The CSA is reviewed looking for   asymmetries in power between homologous areas of the scalp, then compared with   the original EEG recording.    [employer] software was also utilized in the review of this study.  This software   suite analyzes the EEG recording in multiple domains.  Coherence and rhythmicity   are computed to identify EEG sections which may contain organized seizures.    Each channel undergoes analysis to  detect the presence of spike and sharp waves   which have special and morphological characteristics of epileptic activity.  The   routine EEG recording is converted from special into frequency domain.  This is   then displayed comparing homologous areas to identify areas of significant   asymmetry.  Algorithm to identify non-cortically generated artifact is used to   separate artifact from the EEG.    RECORDING TIMES:  Start on 04/03/2017, at 14:48.  End on 04/05/2017, at 09:53.  A total of 42 hours and 4 minutes of EEG monitoring was obtained.    SEIZURES AND EVENTS RECORDED:  None.    EEG FINDINGS:  In the waking state, the posterior dominant rhythm consisted of a   fairly rhythmic 10 Hz to 11 Hz rhythm present in the occipital, parietal and   posterior temporal regions bilaterally.  Low voltage irregular beta was noted in   the more frontal regions.  The posterior dominant rhythm would attenuate with   eye opening and return with eye closure.  With sleep, the expected stages and   cycles were noted.  Throughout the waking and also sleeping state, 1 to 2 second   bursts of 3-1/2 Hz spike wave discharges were seen, which were present   symmetrically over both hemispheres and maximum amplitude in the frontal region.    There was 1 discharge of 3 seconds noted.    Activation procedures were carried out.  Intermittent photic stimulation   produced a mild accentuation of the posterior dominant rhythm without provoking   pathological discharges.  Only 1 during photic stimulation did a 2-second burst   of spike wave activity occur.  Hyperventilation was carried out for 5 minutes.    This was done with good effort and produced rather marked diffuse slowing and   disorganization, which was symmetrical over both hemispheres.  The slowing   normalized within 2 minutes after over breathing ceased.    ICTAL RECORDINGS:  None recorded.    FINAL IMPRESSION:  Seizure Classification:  Primary generalized epilepsy.  Lateralization:   Not applicable.  Localization:  Not applicable.    CLINICAL CORRELATION:  The patient is a 22-year-old female with a history of   ADHD.  The patient has now experienced 3 convulsions, the first of which   occurred on February 17th.  She is not aware of any triggers.  Family history is   negative.  The pattern, however, suggests that the patient has primary   generalized epilepsy.      RR/HN  dd: 04/05/2017 14:28:53 (CDT)  td: 04/05/2017 15:16:31 (CDT)  Doc ID   #[id number]  Job ID #[id number]    CC:

## 2017-04-19 ENCOUNTER — TELEPHONE (OUTPATIENT)
Dept: NEUROLOGY | Facility: CLINIC | Age: 22
End: 2017-04-19

## 2017-04-23 ENCOUNTER — HOSPITAL ENCOUNTER (INPATIENT)
Facility: HOSPITAL | Age: 22
LOS: 1 days | Discharge: HOME | DRG: 101 | End: 2017-04-25
Attending: EMERGENCY MEDICINE | Admitting: INTERNAL MEDICINE
Payer: COMMERCIAL

## 2017-04-23 DIAGNOSIS — G40.919 BREAKTHROUGH SEIZURE: Primary | ICD-10-CM

## 2017-04-23 DIAGNOSIS — R56.9 SEIZURE: ICD-10-CM

## 2017-04-23 LAB
ALBUMIN SERPL BCP-MCNC: 4 G/DL
ALP SERPL-CCNC: 73 U/L
ALT SERPL W/O P-5'-P-CCNC: 13 U/L
ANION GAP SERPL CALC-SCNC: 20 MMOL/L
AST SERPL-CCNC: 16 U/L
BASOPHILS # BLD AUTO: 0.04 K/UL
BASOPHILS NFR BLD: 0.5 %
BILIRUB SERPL-MCNC: 0.3 MG/DL
BUN SERPL-MCNC: 8 MG/DL
CALCIUM SERPL-MCNC: 9.1 MG/DL
CHLORIDE SERPL-SCNC: 107 MMOL/L
CK SERPL-CCNC: 73 U/L
CO2 SERPL-SCNC: 14 MMOL/L
CREAT SERPL-MCNC: 0.8 MG/DL
DIFFERENTIAL METHOD: NORMAL
EOSINOPHIL # BLD AUTO: 0.1 K/UL
EOSINOPHIL NFR BLD: 0.8 %
ERYTHROCYTE [DISTWIDTH] IN BLOOD BY AUTOMATED COUNT: 12.7 %
EST. GFR  (AFRICAN AMERICAN): >60 ML/MIN/1.73 M^2
EST. GFR  (NON AFRICAN AMERICAN): >60 ML/MIN/1.73 M^2
GLUCOSE SERPL-MCNC: 132 MG/DL
HCT VFR BLD AUTO: 37 %
HGB BLD-MCNC: 12.9 G/DL
LACTATE SERPL-SCNC: >12 MMOL/L
LYMPHOCYTES # BLD AUTO: 2.1 K/UL
LYMPHOCYTES NFR BLD: 29.1 %
MCH RBC QN AUTO: 30.6 PG
MCHC RBC AUTO-ENTMCNC: 34.9 %
MCV RBC AUTO: 88 FL
MONOCYTES # BLD AUTO: 0.6 K/UL
MONOCYTES NFR BLD: 7.8 %
NEUTROPHILS # BLD AUTO: 4.5 K/UL
NEUTROPHILS NFR BLD: 61.4 %
PLATELET # BLD AUTO: 285 K/UL
PMV BLD AUTO: 9.9 FL
POCT GLUCOSE: 138 MG/DL (ref 70–110)
POTASSIUM SERPL-SCNC: 3.2 MMOL/L
PROT SERPL-MCNC: 7.6 G/DL
RBC # BLD AUTO: 4.21 M/UL
SODIUM SERPL-SCNC: 141 MMOL/L
WBC # BLD AUTO: 7.35 K/UL

## 2017-04-23 PROCEDURE — 99285 EMERGENCY DEPT VISIT HI MDM: CPT | Mod: 25

## 2017-04-23 PROCEDURE — 63600175 PHARM REV CODE 636 W HCPCS: Performed by: STUDENT IN AN ORGANIZED HEALTH CARE EDUCATION/TRAINING PROGRAM

## 2017-04-23 PROCEDURE — 85025 COMPLETE CBC W/AUTO DIFF WBC: CPT

## 2017-04-23 PROCEDURE — 63600175 PHARM REV CODE 636 W HCPCS

## 2017-04-23 PROCEDURE — 82962 GLUCOSE BLOOD TEST: CPT

## 2017-04-23 PROCEDURE — 80053 COMPREHEN METABOLIC PANEL: CPT

## 2017-04-23 PROCEDURE — 93010 ELECTROCARDIOGRAM REPORT: CPT | Mod: ,,, | Performed by: PEDIATRICS

## 2017-04-23 PROCEDURE — 83735 ASSAY OF MAGNESIUM: CPT

## 2017-04-23 PROCEDURE — 99291 CRITICAL CARE FIRST HOUR: CPT | Mod: ,,, | Performed by: EMERGENCY MEDICINE

## 2017-04-23 PROCEDURE — 93005 ELECTROCARDIOGRAM TRACING: CPT

## 2017-04-23 PROCEDURE — 20600001 HC STEP DOWN PRIVATE ROOM

## 2017-04-23 PROCEDURE — 96365 THER/PROPH/DIAG IV INF INIT: CPT

## 2017-04-23 PROCEDURE — 82550 ASSAY OF CK (CPK): CPT

## 2017-04-23 PROCEDURE — 83605 ASSAY OF LACTIC ACID: CPT

## 2017-04-23 PROCEDURE — 80175 DRUG SCREEN QUAN LAMOTRIGINE: CPT

## 2017-04-23 RX ORDER — LEVETIRACETAM 500 MG/1
500 TABLET ORAL 2 TIMES DAILY
Qty: 39 TABLET | Refills: 0 | Status: SHIPPED | OUTPATIENT
Start: 2017-04-23 | End: 2017-04-25 | Stop reason: HOSPADM

## 2017-04-23 RX ORDER — LAMOTRIGINE 25 MG/1
25 TABLET ORAL
Status: DISCONTINUED | OUTPATIENT
Start: 2017-04-23 | End: 2017-04-23

## 2017-04-23 RX ORDER — LORAZEPAM 2 MG/ML
INJECTION INTRAMUSCULAR
Status: COMPLETED
Start: 2017-04-23 | End: 2017-04-23

## 2017-04-23 RX ORDER — LEVETIRACETAM 500 MG/1
1000 TABLET ORAL
Status: DISCONTINUED | OUTPATIENT
Start: 2017-04-23 | End: 2017-04-23

## 2017-04-23 RX ORDER — LEVETIRACETAM 10 MG/ML
1000 INJECTION INTRAVASCULAR
Status: COMPLETED | OUTPATIENT
Start: 2017-04-23 | End: 2017-04-23

## 2017-04-23 RX ADMIN — LEVETIRACETAM 1000 MG: 10 INJECTION INTRAVENOUS at 10:04

## 2017-04-23 RX ADMIN — LORAZEPAM 2 MG: 2 INJECTION INTRAMUSCULAR; INTRAVENOUS at 09:04

## 2017-04-23 NOTE — ED AVS SNAPSHOT
[facility]  [address] [address]               Bhavna Han   2017  8:47 PM   ED    Description:  Female : 1995   Department:  [facility]           Your Care was Coordinated By:     Provider Role From To    Chintan Talbert MD Attending Provider 17 --    Arvin Nevarez MD Resident 17 --      Reason for Visit     Seizures           Diagnoses this Visit        Comments    Breakthrough seizure    -  Primary       ED Disposition     None           To Do List           Follow-up Information     Go to [facility].    Specialty:  Emergency Medicine    Why:  If symptoms worsen: another seizure, changes in seizure characteristics, head trauma, uncontrollable nausea and vomiting.    Contact information:    [address] [address] [phone]        Follow up with your neurologist. Schedule an appointment as soon as possible for a visit in 3 days.    Why:  To follow up on your visit to the ED and review your medication regimen.       These Medications        Disp Refills Start End    levetiracetam (KEPPRA) 500 MG Tab 39 tablet 0 2017     Take 1 tablet (500 mg total) by mouth 2 (two) times daily. Then, beginning 17, take 1/2 tablet (250 mg) by mouth 2 (two) times daily. - Oral    Pharmacy: [facility] [address] [place] [address] [address] AT [facility] Ph #: [phone]         [facility]     [facility] Care Line - 24/ Assistance  Unless otherwise directed by your provider, please contact [facility], our nurse care line that is available for / assistance.     Registered nurses in the [facility] provide: appointment scheduling, clinical advisement, health education, and other advisory services.  Call: [phone] (toll free)               Medications           Message regarding  Medications     Verify the changes and/or additions to your medication regime listed below are the same as discussed with your clinician today.  If any of these changes or additions are incorrect, please notify your healthcare provider.        These medications were administered today        Dose Freq    lamotrigine tablet 25 mg 25 mg Daily    Starting on: 4/24/2017    Sig: Take 1 tablet (25 mg total) by mouth once daily.    Class: Normal    Route: Oral    levetiracetam tablet 1,000 mg 1,000 mg 2 times daily    Sig: Take 2 tablets (1,000 mg total) by mouth 2 (two) times daily.    Class: Normal    Route: Oral      CHANGE how you are taking these medications     Start Taking Instead of    levetiracetam (KEPPRA) 500 MG Tab levetiracetam (KEPPRA) 500 MG Tab    Dosage:  Take 1 tablet (500 mg total) by mouth 2 (two) times daily. Then, beginning 5/9/17, take 1/2 tablet (250 mg) by mouth 2 (two) times daily. Dosage:  Take 500 mg by mouth 2 (two) times daily.           Verify that the below list of medications is an accurate representation of the medications you are currently taking.  If none reported, the list may be blank. If incorrect, please contact your healthcare provider. Carry this list with you in case of emergency.           Current Medications     lamotrigine (LAMICTAL) 100 MG tablet Starting on Apr 26, 2017. Take 1 tablet (100 mg total) by mouth once daily.    lamotrigine (LAMICTAL) 25 MG tablet Take 1 tablet (25 mg total) by mouth once daily. Increase by 1 tablet each week for 3 weeks.    lisdexamfetamine (VYVANSE) 50 MG capsule Take 50 mg by mouth every morning.    doxycycline (ADOXA) 50 MG tablet Take 50 mg by mouth daily as needed (for acne).    lamotrigine tablet 25 mg Starting on Apr 24, 2017. Take 1 tablet (25 mg total) by mouth once daily.    levetiracetam (KEPPRA) 500 MG Tab Take 1 tablet (500 mg total) by mouth 2 (two) times daily. Then, beginning 5/9/17, take 1/2 tablet (250 mg) by mouth 2 (two) times  "daily.    levetiracetam tablet 1,000 mg Take 2 tablets (1,000 mg total) by mouth 2 (two) times daily.    norethindrone-e.estradiol-iron (LO LOESTRIN FE) 1 mg-10 mcg (24)/10 mcg (2) Tab Take by mouth.           Clinical Reference Information           Your Vitals Were     BP Pulse Temp Resp Height Weight    136/83 (Patient Position: Lying) 105 98.3 °F (36.8 °C) (Oral) 16 4' 9" (1.448 m) 40.8 kg (90 lb)    SpO2 BMI             100% 19.48 kg/m2         Allergies as of 4/23/2017        Reactions    Tramadol Other (See Comments)      Immunizations Administered on Date of Encounter - 4/23/2017     None      ED Micro, Lab, POCT     None      ED Imaging Orders     None        Discharge Instructions         Living Well with Epilepsy  People with epilepsy can lead healthy, productive lives. Life with epilepsy can be challenging, but there are things you can do to make it easier. For example, you can pay attention to your emotions. If you feel down, upset, or scared, talk to your healthcare provider. And be open with the people in your life. Talking about epilepsy can help them understand. It can also help you feel better.  Coping with emotions  You may be scared to go out in public for fear of having a seizure. Or you may just get frustrated with having epilepsy. Such feelings are normal. But they can lead to anxiety and depression. Treatment is available for these conditions, so talk to your healthcare provider. Discuss what can help you, such as the following:  · Support groups give you the chance to talk with other people who have epilepsy.  · Counseling helps you learn to cope with your emotions and health problems.  · Medicine can help if you have a mood disorder.     Recognizing signs of depression  Depression is an illness that affects your thoughts and feelings. It can be caused by trouble coping with epilepsy, and sometimes it may be caused by the medicines used to treat it. Depression can be serious. If you have any " of the following, call your healthcare provider:  · Feeling down most of the time  · Feeling hopeless or helpless  · Losing pleasure in things you used to enjoy  · Sleeping less or more than usual  · Having a big change in appetite or weight  · Having trouble focusing, remembering, or making decisions  · Isolating yourself from friends or family    Coping at home  Epilepsy affects those around you, too. Talk with your loved ones and learn their concerns. For instance, your children may be afraid for your safety. Reassure them that you can live a long, healthy life with epilepsy. Your partner may wonder if a normal sex life is possible. Let him or her know that epilepsy doesnt have to affect intimacy. If loved ones have questions, you can always arrange a talk with your healthcare provider.  Epilepsy and your job  Epilepsy doesnt have to keep you from working. In fact, people with epilepsy hold many kinds of jobs. But there are some issues you should consider, such as:  · What kind of work can I do? This depends on several things, like how well controlled your seizures are. Also consider whether the job involves tasks that may not be safe for you. These include driving or operating heavy machinery.  · Should I tell my boss or coworkers about my epilepsy? This is your personal choice. But you may be safer if people at your workplace are prepared to respond to a seizure. If you are concerned about losing your job, know your rights. The Americans with Disabilities Act provides work-related protections for people with epilepsy.  Date Last Reviewed: 9/10/2015  © 8908-0701 [employer]. [address] [place] [address], [place] [address]. All rights reserved. This information is not intended as a substitute for professional medical care. Always follow your healthcare professional's instructions.      Lamotrigine tablets  What is this medicine?  LAMOTRIGINE (la JN tri jeen) is used to control seizures in adults and  children with epilepsy and Lennox-Gastaut syndrome. It is also used in adults to treat bipolar disorder.  How should I use this medicine?  Take this medicine by mouth with a glass of water. Follow the directions on the prescription label. Do not chew these tablets. If this medicine upsets your stomach, take it with food or milk. Take your doses at regular intervals. Do not take your medicine more often than directed.  A special MedGuide will be given to you by the pharmacist with each new prescription and refill. Be sure to read this information carefully each time.  Talk to your pediatrician regarding the use of this medicine in children. While this drug may be prescribed for children as young as 2 years for selected conditions, precautions do apply.  What side effects may I notice from receiving this medicine?  Side effects you should report to your doctor or health care professional as soon as possible:  · allergic reactions like skin rash, itching or hives, swelling of the face, lips, or tongue  · blurred or double vision  · difficulty walking or controlling muscle movements  · fever  · headache, stiff neck, and sensitivity to light  · painful sores in the mouth, eyes, or nose  · redness, blistering, peeling or loosening of the skin, including inside the mouth  · severe muscle pain  · swollen lymph glands  · uncontrollable eye movements  · unusual bruising or bleeding  · unusually weak or tired  · vomiting  · worsening of mood, thoughts or actions of suicide or dying  · yellowing of the eyes or skin  Side effects that usually do not require medical attention (report to your doctor or health care professional if they continue or are bothersome):  · diarrhea or constipation  · difficulty sleeping  · nausea  · tremors  What may interact with this medicine?  · carbamazepine  · female hormones, including contraceptive or birth control  pills  · methotrexate  · phenobarbital  · phenytoin  · primidone  · pyrimethamine  · rifampin  · trimethoprim  · valproic acid  What if I miss a dose?  If you miss a dose, take it as soon as you can. If it is almost time for your next dose, take only that dose. Do not take double or extra doses.  Where should I keep my medicine?  Keep out of reach of children.  Store at room temperature between 15 and 30 degrees C (59 and 86 degrees F). Throw away any unused medicine after the expiration date.  What should I tell my health care provider before I take this medicine?  They need to know if you have any of these conditions:  · a history of depression or bipolar disorder  · aseptic meningitis during prior use of lamotrigine  · folate deficiency  · kidney disease  · liver disease  · suicidal thoughts, plans, or attempt; a previous suicide attempt by you or a family member  · an unusual or allergic reaction to lamotrigine or other seizure medications, other medicines, foods, dyes, or preservatives  · pregnant or trying to get pregnant  · breast-feeding  What should I watch for while using this medicine?  Visit your doctor or health care professional for regular checks on your progress. If you take this medicine for seizures, wear a Medic Alert bracelet or necklace. Carry an identification card with information about your condition, medicines, and doctor or health care professional.  It is important to take this medicine exactly as directed. When first starting treatment, your dose will need to be adjusted slowly. It may take weeks or months before your dose is stable. You should contact your doctor or health care professional if your seizures get worse or if you have any new types of seizures. Do not stop taking this medicine unless instructed by your doctor or health care professional. Stopping your medicine suddenly can increase your seizures or their severity.  Contact your doctor or health care professional right away  if you develop a rash while taking this medicine. Rashes may be very severe and sometimes require treatment in the hospital. Deaths from rashes have occurred. Serious rashes occur more often in children than adults taking this medicine. It is more common for these serious rashes to occur during the first 2 months of treatment, but a rash can occur at any time.  You may get drowsy, dizzy, or have blurred vision. Do not drive, use machinery, or do anything that needs mental alertness until you know how this medicine affects you. To reduce dizzy or fainting spells, do not sit or stand up quickly, especially if you are an older patient. Alcohol can increase drowsiness and dizziness. Avoid alcoholic drinks.  If you are taking this medicine for bipolar disorder, it is important to report any changes in your mood to your doctor or health care professional. If your condition gets worse, you get mentally depressed, feel very hyperactive or manic, have difficulty sleeping, or have thoughts of hurting yourself or committing suicide, you need to get help from your health care professional right away. If you are a caregiver for someone taking this medicine for bipolar disorder, you should also report these behavioral changes right away. The use of this medicine may increase the chance of suicidal thoughts or actions. Pay special attention to how you are responding while on this medicine.  Your mouth may get dry. Chewing sugarless gum or sucking hard candy, and drinking plenty of water may help. Contact your doctor if the problem does not go away or is severe.  Women who become pregnant while using this medicine may enroll in the [place] Antiepileptic Drug Pregnancy Registry by calling 1-634.602.4755. This registry collects information about the safety of antiepileptic drug use during pregnancy.  Date Last Reviewed:   NOTE:This sheet is a summary. It may not cover all possible information. If you have questions about this  medicine, talk to your doctor, pharmacist, or health care provider. Copyright© 2016 [employer]      Levetiracetam tablets  What is this medicine?  LEVETIRACETAM (wendy gonzaleze EBEN white) is an antiepileptic drug. It is used with other medicines to treat certain types of seizures.  How should I use this medicine?  Take this medicine by mouth with a glass of water. Follow the directions on the prescription label. Swallow the tablets whole. Do not crush or chew this medicine. You may take this medicine with or without food. Take your doses at regular intervals. Do not take your medicine more often than directed. Do not stop taking this medicine or any of your seizure medicines unless instructed by your doctor or health care professional. Stopping your medicine suddenly can increase your seizures or their severity.  A special MedGuide will be given to you by the pharmacist with each prescription and refill. Be sure to read this information carefully each time.  Contact your pediatrician or health care professional regarding the use of this medication in children. While this drug may be prescribed for children as young as 4 years of age for selected conditions, precautions do apply.  What side effects may I notice from receiving this medicine?  Side effects you should report to your doctor or health care professional as soon as possible:  · allergic reactions like skin rash, itching or hives, swelling of the face, lips, or tongue  · breathing problems  · dark urine  · general ill feeling or flu-like symptoms  · problems with balance, talking, walking  · unusually weak or tired  · worsening of mood, thoughts or actions of suicide or dying  · yellowing of the eyes or skin  Side effects that usually do not require medical attention (report to your doctor or health care professional if they continue or are bothersome):  · diarrhea  · dizzy, drowsy  · headache  · loss of appetite  What may interact with this medicine?  This  medicine may interact with the following medications:  · carbamazepine  · colesevelam  · probenecid  · sevelamer  What if I miss a dose?  If you miss a dose, take it as soon as you can. If it is almost time for your next dose, take only that dose. Do not take double or extra doses.  Where should I keep my medicine?  Keep out of reach of children.  Store at room temperature between 15 and 30 degrees C (59 and 86 degrees F). Throw away any unused medicine after the expiration date.  What should I tell my health care provider before I take this medicine?  They need to know if you have any of these conditions:  · kidney disease  · suicidal thoughts, plans, or attempt; a previous suicide attempt by you or a family member  · an unusual or allergic reaction to levetiracetam, other medicines, foods, dyes, or preservatives  · pregnant or trying to get pregnant  · breast-feeding  What should I watch for while using this medicine?  Visit your doctor or health care professional for a regular check on your progress. Wear a medical identification bracelet or chain to say you have epilepsy, and carry a card that lists all your medications.  It is important to take this medicine exactly as instructed by your health care professional. When first starting treatment, your dose may need to be adjusted. It may take weeks or months before your dose is stable. You should contact your doctor or health care professional if your seizures get worse or if you have any new types of seizures.  You may get drowsy or dizzy. Do not drive, use machinery, or do anything that needs mental alertness until you know how this medicine affects you. Do not stand or sit up quickly, especially if you are an older patient. This reduces the risk of dizzy or fainting spells. Alcohol may interfere with the effect of this medicine. Avoid alcoholic drinks.  The use of this medicine may increase the chance of suicidal thoughts or actions. Pay special attention to  how you are responding while on this medicine. Any worsening of mood, or thoughts of suicide or dying should be reported to your health care professional right away.  Women who become pregnant while using this medicine may enroll in the [place] American Antiepileptic Drug Pregnancy Registry by calling 1-648.955.3184. This registry collects information about the safety of antiepileptic drug use during pregnancy.  Date Last Reviewed:   NOTE:This sheet is a summary. It may not cover all possible information. If you have questions about this medicine, talk to your doctor, pharmacist, or health care provider. Copyright© 2016 [employer]              Discharge References/Attachments     LAMOTRIGINE TABLETS (ENGLISH)    LEVETIRACETAM TABLETS (ENGLISH)       [facility] complies with applicable Federal civil rights laws and does not discriminate on the basis of race, color, national origin, age, disability, or sex.        Language Assistance Services     ATTENTION: Language assistance services are available, free of charge. Please call [phone].      ATENCIÓN: Si habla español, tiene a short disposición servicios gratuitos de asistencia lingüística. Llame al [phone].     HAMLET Ý: N?u b?n nói Ti?ng Vi?t, có các d?ch v? h? tr? ngôn ng? mi?n phí dành cho b?n. G?i s? [phone].

## 2017-04-23 NOTE — IP AVS SNAPSHOT
[facility]  [address]  [facility] [address]  Phone: [phone]           Patient Discharge Instructions   Our goal is to set you up for success. This packet includes information on your condition, medications, and your home care.  It will help you care for yourself to prevent having to return to the hospital.     Please ask your nurse if you have any questions.      There are many details to remember when preparing to leave the hospital. Here is what you will need to do:    1. Take your medicine. If you are prescribed medications, review your Medication List on the following pages. You may have new medications to  at the pharmacy and others that you'll need to stop taking. Review the instructions for how and when to take your medications. Talk with your doctor or nurses if you are unsure of what to do.     2. Go to your follow-up appointments. Specific follow-up information is listed in the following pages. Your may be contacted by a nurse or clinical provider about future appointments. Be sure we have all of the phone numbers to reach you. Please contact your provider's office if you are unable to make an appointment.     3. Watch for warning signs. Your doctor or nurse will give you detailed warning signs to watch for and when to call for assistance. These instructions may also include educational information about your condition. If you experience any of warning signs to your health, call your doctor.           [facility]  Unless otherwise directed by your provider, please   contact [facility], our nurse care line   that is available for 24/7 assistance.     [phone] (toll-free)     Registered nurses in the [facility]   provide: appointment scheduling, clinical advisement, health education, and other advisory services.                  ** Verify the list of medication(s) below is accurate and up to date. Carry this with you in case of  emergency. If your medications have changed, please notify your healthcare provider.             Medication List      CHANGE how you take these medications        Additional Info                      * lamotrigine 25 MG tablet   Commonly known as:  LAMICTAL   Quantity:  42 tablet   Refills:  0   Dose:  25 mg   What changed:    - when to take this  - additional instructions    Last time this was given:  100 mg on 4/25/2017  8:58 AM   Instructions:  Take 1 tablet (25 mg total) by mouth once daily. Increase by 1 tablet each week for 3 weeks.     Begin Date    AM    Noon    PM    Bedtime       * lamotrigine 100 MG tablet   Commonly known as:  LAMICTAL   Quantity:  30 tablet   Refills:  2   Dose:  100 mg   What changed:  Another medication with the same name was changed. Make sure you understand how and when to take each.    Start Date:  4/26/2017   Last time this was given:  100 mg on 4/25/2017  8:58 AM   Instructions:  Take 1 tablet (100 mg total) by mouth once daily.     Begin Date    AM    Noon    PM    Bedtime       levetiracetam 500 MG Tab   Commonly known as:  KEPPRA   Quantity:  39 tablet   Refills:  0   Dose:  500 mg   What changed:  additional instructions    Instructions:  Take 1 tablet (500 mg total) by mouth 2 (two) times daily. Then, beginning 5/9/17, take 1/2 tablet (250 mg) by mouth 2 (two) times daily.     Begin Date    AM    Noon    PM    Bedtime       * Notice:  This list has 2 medication(s) that are the same as other medications prescribed for you. Read the directions carefully, and ask your doctor or other care provider to review them with you.      CONTINUE taking these medications        Additional Info                      doxycycline 50 MG tablet   Commonly known as:  ADOXA   Refills:  0   Dose:  50 mg    Instructions:  Take 50 mg by mouth daily as needed (for acne).     Begin Date    AM    Noon    PM    Bedtime       lisdexamfetamine 50 MG capsule   Commonly known as:  VYVANSE   Refills:  0    Dose:  50 mg    Instructions:  Take 50 mg by mouth every morning.     Begin Date    AM    Noon    PM    Bedtime       LO LOESTRIN FE 1 mg-10 mcg (24)/10 mcg (2) Tab   Refills:  0   Dose:  1 tablet   Generic drug:  norethindrone-e.estradiol-iron    Instructions:  Take 1 tablet by mouth once daily.     Begin Date    AM    Noon    PM    Bedtime            Where to Get Your Medications      You can get these medications from any pharmacy     Bring a paper prescription for each of these medications     levetiracetam 500 MG Tab                  Please bring to all follow up appointments:    1. A copy of your discharge instructions.  2. All medicines you are currently taking in their original bottles.  3. Identification and insurance card.    Please arrive 15 minutes ahead of scheduled appointment time.    Please call 24 hours in advance if you must reschedule your appointment and/or time.        Follow-up Information     Go to [facility].    Specialty:  Emergency Medicine    Why:  If symptoms worsen: another seizure, changes in seizure characteristics, head trauma, uncontrollable nausea and vomiting.    Contact information:    [address]  [facility] [address] [phone]        Follow up with your neurologist. Schedule an appointment as soon as possible for a visit in 3 days.    Why:  To follow up on your visit to the ED and review your medication regimen.        Schedule an appointment as soon as possible for a visit with Briseida Rawls MD.    Specialty:  Neurology    Contact information:    [address]  [facility] [address] [phone]          Discharge Instructions     Future Orders    Activity as tolerated     Diet general     Questions:    Total calories:      Fat restriction, if any:      Protein restriction, if any:      Na restriction, if any:      Fluid restriction:      Additional restrictions:      Other restrictions (specify):     Scheduling Instructions:    No  driving or operating heavy machinery        Discharge Instructions         Date Name Bhavan Han MRN [id number]      4/26/2017             Lamictal  Lamictal        Dosage 25 mg  100 mg       Yesterday 0   0        2017/4/26 1   1        2017/5/3 2   1        2017/5/10 3   1        2017/5/17 0 stop  2 stay on this dose    Patient should take 25 mg tonight and follow the above taper schedule tomorrow.    Living Well with Epilepsy  People with epilepsy can lead healthy, productive lives. Life with epilepsy can be challenging, but there are things you can do to make it easier. For example, you can pay attention to your emotions. If you feel down, upset, or scared, talk to your healthcare provider. And be open with the people in your life. Talking about epilepsy can help them understand. It can also help you feel better.  Coping with emotions  You may be scared to go out in public for fear of having a seizure. Or you may just get frustrated with having epilepsy. Such feelings are normal. But they can lead to anxiety and depression. Treatment is available for these conditions, so talk to your healthcare provider. Discuss what can help you, such as the following:  · Support groups give you the chance to talk with other people who have epilepsy.  · Counseling helps you learn to cope with your emotions and health problems.  · Medicine can help if you have a mood disorder.     Recognizing signs of depression  Depression is an illness that affects your thoughts and feelings. It can be caused by trouble coping with epilepsy, and sometimes it may be caused by the medicines used to treat it. Depression can be serious. If you have any of the following, call your healthcare provider:  · Feeling down most of the time  · Feeling hopeless or helpless  · Losing pleasure in things you used to enjoy  · Sleeping less or more than usual  · Having a big change in appetite or weight  · Having trouble focusing, remembering, or making  decisions  · Isolating yourself from friends or family    Coping at home  Epilepsy affects those around you, too. Talk with your loved ones and learn their concerns. For instance, your children may be afraid for your safety. Reassure them that you can live a long, healthy life with epilepsy. Your partner may wonder if a normal sex life is possible. Let him or her know that epilepsy doesnt have to affect intimacy. If loved ones have questions, you can always arrange a talk with your healthcare provider.  Epilepsy and your job  Epilepsy doesnt have to keep you from working. In fact, people with epilepsy hold many kinds of jobs. But there are some issues you should consider, such as:  · What kind of work can I do? This depends on several things, like how well controlled your seizures are. Also consider whether the job involves tasks that may not be safe for you. These include driving or operating heavy machinery.  · Should I tell my boss or coworkers about my epilepsy? This is your personal choice. But you may be safer if people at your workplace are prepared to respond to a seizure. If you are concerned about losing your job, know your rights. The Americans with Disabilities Act provides work-related protections for people with epilepsy.  Date Last Reviewed: 9/10/2015  © 0196-1928 [employer]. [address] [place] [address], [place] [address]. All rights reserved. This information is not intended as a substitute for professional medical care. Always follow your healthcare professional's instructions.      Lamotrigine tablets  What is this medicine?  LAMOTRIGINE (la JN tri jeen) is used to control seizures in adults and children with epilepsy and Lennox-Gastaut syndrome. It is also used in adults to treat bipolar disorder.  How should I use this medicine?  Take this medicine by mouth with a glass of water. Follow the directions on the prescription label. Do not chew these tablets. If this medicine upsets your  stomach, take it with food or milk. Take your doses at regular intervals. Do not take your medicine more often than directed.  A special MedGuide will be given to you by the pharmacist with each new prescription and refill. Be sure to read this information carefully each time.  Talk to your pediatrician regarding the use of this medicine in children. While this drug may be prescribed for children as young as 2 years for selected conditions, precautions do apply.  What side effects may I notice from receiving this medicine?  Side effects you should report to your doctor or health care professional as soon as possible:  · allergic reactions like skin rash, itching or hives, swelling of the face, lips, or tongue  · blurred or double vision  · difficulty walking or controlling muscle movements  · fever  · headache, stiff neck, and sensitivity to light  · painful sores in the mouth, eyes, or nose  · redness, blistering, peeling or loosening of the skin, including inside the mouth  · severe muscle pain  · swollen lymph glands  · uncontrollable eye movements  · unusual bruising or bleeding  · unusually weak or tired  · vomiting  · worsening of mood, thoughts or actions of suicide or dying  · yellowing of the eyes or skin  Side effects that usually do not require medical attention (report to your doctor or health care professional if they continue or are bothersome):  · diarrhea or constipation  · difficulty sleeping  · nausea  · tremors  What may interact with this medicine?  · carbamazepine  · female hormones, including contraceptive or birth control pills  · methotrexate  · phenobarbital  · phenytoin  · primidone  · pyrimethamine  · rifampin  · trimethoprim  · valproic acid  What if I miss a dose?  If you miss a dose, take it as soon as you can. If it is almost time for your next dose, take only that dose. Do not take double or extra doses.  Where should I keep my medicine?  Keep out of reach of children.  Store at room  temperature between 15 and 30 degrees C (59 and 86 degrees F). Throw away any unused medicine after the expiration date.  What should I tell my health care provider before I take this medicine?  They need to know if you have any of these conditions:  · a history of depression or bipolar disorder  · aseptic meningitis during prior use of lamotrigine  · folate deficiency  · kidney disease  · liver disease  · suicidal thoughts, plans, or attempt; a previous suicide attempt by you or a family member  · an unusual or allergic reaction to lamotrigine or other seizure medications, other medicines, foods, dyes, or preservatives  · pregnant or trying to get pregnant  · breast-feeding  What should I watch for while using this medicine?  Visit your doctor or health care professional for regular checks on your progress. If you take this medicine for seizures, wear a Medic Alert bracelet or necklace. Carry an identification card with information about your condition, medicines, and doctor or health care professional.  It is important to take this medicine exactly as directed. When first starting treatment, your dose will need to be adjusted slowly. It may take weeks or months before your dose is stable. You should contact your doctor or health care professional if your seizures get worse or if you have any new types of seizures. Do not stop taking this medicine unless instructed by your doctor or health care professional. Stopping your medicine suddenly can increase your seizures or their severity.  Contact your doctor or health care professional right away if you develop a rash while taking this medicine. Rashes may be very severe and sometimes require treatment in the hospital. Deaths from rashes have occurred. Serious rashes occur more often in children than adults taking this medicine. It is more common for these serious rashes to occur during the first 2 months of treatment, but a rash can occur at any time.  You may get  drowsy, dizzy, or have blurred vision. Do not drive, use machinery, or do anything that needs mental alertness until you know how this medicine affects you. To reduce dizzy or fainting spells, do not sit or stand up quickly, especially if you are an older patient. Alcohol can increase drowsiness and dizziness. Avoid alcoholic drinks.  If you are taking this medicine for bipolar disorder, it is important to report any changes in your mood to your doctor or health care professional. If your condition gets worse, you get mentally depressed, feel very hyperactive or manic, have difficulty sleeping, or have thoughts of hurting yourself or committing suicide, you need to get help from your health care professional right away. If you are a caregiver for someone taking this medicine for bipolar disorder, you should also report these behavioral changes right away. The use of this medicine may increase the chance of suicidal thoughts or actions. Pay special attention to how you are responding while on this medicine.  Your mouth may get dry. Chewing sugarless gum or sucking hard candy, and drinking plenty of water may help. Contact your doctor if the problem does not go away or is severe.  Women who become pregnant while using this medicine may enroll in the [place] American Antiepileptic Drug Pregnancy Registry by calling 1-704.845.5115. This registry collects information about the safety of antiepileptic drug use during pregnancy.  Date Last Reviewed:   NOTE:This sheet is a summary. It may not cover all possible information. If you have questions about this medicine, talk to your doctor, pharmacist, or health care provider. Copyright© 2016 [employer]      Levetiracetam tablets  What is this medicine?  LEVETIRACETAM (wendy white) is an antiepileptic drug. It is used with other medicines to treat certain types of seizures.  How should I use this medicine?  Take this medicine by mouth with a glass of water. Follow the  directions on the prescription label. Swallow the tablets whole. Do not crush or chew this medicine. You may take this medicine with or without food. Take your doses at regular intervals. Do not take your medicine more often than directed. Do not stop taking this medicine or any of your seizure medicines unless instructed by your doctor or health care professional. Stopping your medicine suddenly can increase your seizures or their severity.  A special MedGuide will be given to you by the pharmacist with each prescription and refill. Be sure to read this information carefully each time.  Contact your pediatrician or health care professional regarding the use of this medication in children. While this drug may be prescribed for children as young as 4 years of age for selected conditions, precautions do apply.  What side effects may I notice from receiving this medicine?  Side effects you should report to your doctor or health care professional as soon as possible:  · allergic reactions like skin rash, itching or hives, swelling of the face, lips, or tongue  · breathing problems  · dark urine  · general ill feeling or flu-like symptoms  · problems with balance, talking, walking  · unusually weak or tired  · worsening of mood, thoughts or actions of suicide or dying  · yellowing of the eyes or skin  Side effects that usually do not require medical attention (report to your doctor or health care professional if they continue or are bothersome):  · diarrhea  · dizzy, drowsy  · headache  · loss of appetite  What may interact with this medicine?  This medicine may interact with the following medications:  · carbamazepine  · colesevelam  · probenecid  · sevelamer  What if I miss a dose?  If you miss a dose, take it as soon as you can. If it is almost time for your next dose, take only that dose. Do not take double or extra doses.  Where should I keep my medicine?  Keep out of reach of children.  Store at room temperature  between 15 and 30 degrees C (59 and 86 degrees F). Throw away any unused medicine after the expiration date.  What should I tell my health care provider before I take this medicine?  They need to know if you have any of these conditions:  · kidney disease  · suicidal thoughts, plans, or attempt; a previous suicide attempt by you or a family member  · an unusual or allergic reaction to levetiracetam, other medicines, foods, dyes, or preservatives  · pregnant or trying to get pregnant  · breast-feeding  What should I watch for while using this medicine?  Visit your doctor or health care professional for a regular check on your progress. Wear a medical identification bracelet or chain to say you have epilepsy, and carry a card that lists all your medications.  It is important to take this medicine exactly as instructed by your health care professional. When first starting treatment, your dose may need to be adjusted. It may take weeks or months before your dose is stable. You should contact your doctor or health care professional if your seizures get worse or if you have any new types of seizures.  You may get drowsy or dizzy. Do not drive, use machinery, or do anything that needs mental alertness until you know how this medicine affects you. Do not stand or sit up quickly, especially if you are an older patient. This reduces the risk of dizzy or fainting spells. Alcohol may interfere with the effect of this medicine. Avoid alcoholic drinks.  The use of this medicine may increase the chance of suicidal thoughts or actions. Pay special attention to how you are responding while on this medicine. Any worsening of mood, or thoughts of suicide or dying should be reported to your health care professional right away.  Women who become pregnant while using this medicine may enroll in the [place] American Antiepileptic Drug Pregnancy Registry by calling 1-634.599.2205. This registry collects information about the safety of  "antiepileptic drug use during pregnancy.  Date Last Reviewed:   NOTE:This sheet is a summary. It may not cover all possible information. If you have questions about this medicine, talk to your doctor, pharmacist, or health care provider. Copyright© 2016 [employer]              Discharge References/Attachments     LAMOTRIGINE TABLETS (ENGLISH)    LEVETIRACETAM TABLETS (ENGLISH)        Primary Diagnosis     Your primary diagnosis was:  Seizure Disorder      Admission Information     Date & Time Provider Department CSN    4/23/2017  8:47 PM Kayli Zamora MD [facility] [id number]      Care Providers     Provider Role Specialty Primary office phone    Kayli Zamora MD Attending Provider Hospitalist [phone]    Ramone Barraza MD Consulting Physician  Neuro Critical Care [phone]    Bettina Coleman MD Consulting Physician  Hospitalist [phone]    Bettina Coleman MD Team Attending  Hospitalist [phone]      Your Vitals Were     BP Pulse Temp Resp Height Weight    132/82 (BP Location: Right arm, Patient Position: Sitting) 82 98.4 °F (36.9 °C) (Oral) 18 4' 9" (1.448 m) 41.3 kg (91 lb 0.8 oz)    SpO2 BMI             98% 19.7 kg/m2         Recent Lab Values     No lab values to display.      Pending Labs     Order Current Status    Toxicology screen, urine In process      Allergies as of 4/25/2017        Reactions    Tramadol Other (See Comments)      Advance Directives     An advance directive is a document which, in the event you are no longer able to make decisions for yourself, tells your healthcare team what kind of treatment you do or do not want to receive, or who you would like to make those decisions for you.  If you do not currently have an advance directive, [facility] encourages you to create one.  For more information call:  [phone]), [phone] ([phone]),  or log on to [email or web address].        Language Assistance Services  "    ATTENTION: Language assistance services are available, free of charge. Please call [phone].      ATENCIÓN: Si habla romeliaañol, tiene a short disposición servicios gratuitos de asistencia lingüística. Llame al [phone].     CHÚ Ý: N?u b?n nói Ti?ng Vi?t, có các d?ch v? h? tr? ngôn ng? mi?n phí dành cho b?n. G?i s? [phone].         [facility] complies with applicable Federal civil rights laws and does not discriminate on the basis of race, color, national origin, age, disability, or sex.

## 2017-04-24 PROBLEM — E87.29 HIGH ANION GAP METABOLIC ACIDOSIS: Status: ACTIVE | Noted: 2017-04-24

## 2017-04-24 PROBLEM — E87.6 HYPOKALEMIA: Status: ACTIVE | Noted: 2017-04-24

## 2017-04-24 PROBLEM — R00.0 SINUS TACHYCARDIA: Status: ACTIVE | Noted: 2017-04-24

## 2017-04-24 PROBLEM — E87.20 LACTIC ACIDOSIS: Status: ACTIVE | Noted: 2017-04-24

## 2017-04-24 LAB
ANION GAP SERPL CALC-SCNC: 7 MMOL/L
ANISOCYTOSIS BLD QL SMEAR: SLIGHT
BACTERIA #/AREA URNS AUTO: ABNORMAL /HPF
BASOPHILS # BLD AUTO: 0.03 K/UL
BASOPHILS NFR BLD: 0.3 %
BILIRUB UR QL STRIP: NEGATIVE
BUN SERPL-MCNC: 7 MG/DL
CALCIUM SERPL-MCNC: 8.2 MG/DL
CHLORIDE SERPL-SCNC: 111 MMOL/L
CLARITY UR REFRACT.AUTO: ABNORMAL
CO2 SERPL-SCNC: 24 MMOL/L
COLOR UR AUTO: YELLOW
CREAT SERPL-MCNC: 0.6 MG/DL
DIFFERENTIAL METHOD: ABNORMAL
EOSINOPHIL # BLD AUTO: 0.1 K/UL
EOSINOPHIL NFR BLD: 0.5 %
ERYTHROCYTE [DISTWIDTH] IN BLOOD BY AUTOMATED COUNT: 12.8 %
EST. GFR  (AFRICAN AMERICAN): >60 ML/MIN/1.73 M^2
EST. GFR  (NON AFRICAN AMERICAN): >60 ML/MIN/1.73 M^2
GLUCOSE SERPL-MCNC: 89 MG/DL
GLUCOSE UR QL STRIP: NEGATIVE
HCT VFR BLD AUTO: 35.2 %
HGB BLD-MCNC: 11.5 G/DL
HGB UR QL STRIP: NEGATIVE
KETONES UR QL STRIP: NEGATIVE
LACTATE SERPL-SCNC: 0.7 MMOL/L
LEUKOCYTE ESTERASE UR QL STRIP: ABNORMAL
LYMPHOCYTES # BLD AUTO: 2.2 K/UL
LYMPHOCYTES NFR BLD: 21.2 %
MAGNESIUM SERPL-MCNC: 2.1 MG/DL
MAGNESIUM SERPL-MCNC: 2.3 MG/DL
MCH RBC QN AUTO: 29.6 PG
MCHC RBC AUTO-ENTMCNC: 32.7 %
MCV RBC AUTO: 91 FL
MICROSCOPIC COMMENT: ABNORMAL
MONOCYTES # BLD AUTO: 0.9 K/UL
MONOCYTES NFR BLD: 8.9 %
NEUTROPHILS # BLD AUTO: 7.2 K/UL
NEUTROPHILS NFR BLD: 69.1 %
NITRITE UR QL STRIP: NEGATIVE
PH UR STRIP: 6 [PH] (ref 5–8)
PHOSPHATE SERPL-MCNC: 3.5 MG/DL
PLATELET # BLD AUTO: 268 K/UL
PLATELET BLD QL SMEAR: ABNORMAL
PMV BLD AUTO: 9.8 FL
POTASSIUM SERPL-SCNC: 3.8 MMOL/L
PROT UR QL STRIP: NEGATIVE
RBC # BLD AUTO: 3.89 M/UL
RBC #/AREA URNS AUTO: 1 /HPF (ref 0–4)
SODIUM SERPL-SCNC: 142 MMOL/L
SP GR UR STRIP: 1.02 (ref 1–1.03)
SQUAMOUS #/AREA URNS AUTO: 3 /HPF
URN SPEC COLLECT METH UR: ABNORMAL
UROBILINOGEN UR STRIP-ACNC: NEGATIVE EU/DL
WBC # BLD AUTO: 10.48 K/UL
WBC #/AREA URNS AUTO: 15 /HPF (ref 0–5)

## 2017-04-24 PROCEDURE — 80048 BASIC METABOLIC PNL TOTAL CA: CPT

## 2017-04-24 PROCEDURE — 20600001 HC STEP DOWN PRIVATE ROOM

## 2017-04-24 PROCEDURE — 25000003 PHARM REV CODE 250: Performed by: INTERNAL MEDICINE

## 2017-04-24 PROCEDURE — 25000003 PHARM REV CODE 250: Performed by: EMERGENCY MEDICINE

## 2017-04-24 PROCEDURE — 83735 ASSAY OF MAGNESIUM: CPT

## 2017-04-24 PROCEDURE — 96361 HYDRATE IV INFUSION ADD-ON: CPT

## 2017-04-24 PROCEDURE — 99223 1ST HOSP IP/OBS HIGH 75: CPT | Mod: ,,, | Performed by: INTERNAL MEDICINE

## 2017-04-24 PROCEDURE — 25000003 PHARM REV CODE 250

## 2017-04-24 PROCEDURE — 95819 EEG AWAKE AND ASLEEP: CPT

## 2017-04-24 PROCEDURE — 84100 ASSAY OF PHOSPHORUS: CPT

## 2017-04-24 PROCEDURE — 63600175 PHARM REV CODE 636 W HCPCS: Performed by: INTERNAL MEDICINE

## 2017-04-24 PROCEDURE — 85025 COMPLETE CBC W/AUTO DIFF WBC: CPT

## 2017-04-24 PROCEDURE — 81001 URINALYSIS AUTO W/SCOPE: CPT

## 2017-04-24 PROCEDURE — 63600175 PHARM REV CODE 636 W HCPCS: Performed by: STUDENT IN AN ORGANIZED HEALTH CARE EDUCATION/TRAINING PROGRAM

## 2017-04-24 PROCEDURE — 36415 COLL VENOUS BLD VENIPUNCTURE: CPT

## 2017-04-24 PROCEDURE — 80307 DRUG TEST PRSMV CHEM ANLYZR: CPT

## 2017-04-24 PROCEDURE — 95819 EEG AWAKE AND ASLEEP: CPT | Mod: 26,,, | Performed by: PSYCHIATRY & NEUROLOGY

## 2017-04-24 PROCEDURE — 83605 ASSAY OF LACTIC ACID: CPT

## 2017-04-24 RX ORDER — ONDANSETRON 2 MG/ML
4 INJECTION INTRAMUSCULAR; INTRAVENOUS EVERY 8 HOURS PRN
Status: DISCONTINUED | OUTPATIENT
Start: 2017-04-24 | End: 2017-04-25 | Stop reason: HOSPADM

## 2017-04-24 RX ORDER — LEVETIRACETAM 5 MG/ML
500 INJECTION INTRAVASCULAR EVERY 12 HOURS
Status: DISCONTINUED | OUTPATIENT
Start: 2017-04-24 | End: 2017-04-24

## 2017-04-24 RX ORDER — LAMOTRIGINE 25 MG/1
75 TABLET ORAL DAILY
Status: DISCONTINUED | OUTPATIENT
Start: 2017-04-24 | End: 2017-04-24

## 2017-04-24 RX ORDER — ENOXAPARIN SODIUM 100 MG/ML
40 INJECTION SUBCUTANEOUS EVERY 24 HOURS
Status: DISCONTINUED | OUTPATIENT
Start: 2017-04-24 | End: 2017-04-25 | Stop reason: HOSPADM

## 2017-04-24 RX ORDER — POTASSIUM CHLORIDE 750 MG/1
40 CAPSULE, EXTENDED RELEASE ORAL ONCE
Status: COMPLETED | OUTPATIENT
Start: 2017-04-24 | End: 2017-04-24

## 2017-04-24 RX ORDER — LAMOTRIGINE 100 MG/1
100 TABLET ORAL DAILY
Status: DISCONTINUED | OUTPATIENT
Start: 2017-04-25 | End: 2017-04-25 | Stop reason: HOSPADM

## 2017-04-24 RX ORDER — AMOXICILLIN 250 MG
1 CAPSULE ORAL DAILY PRN
Status: DISCONTINUED | OUTPATIENT
Start: 2017-04-24 | End: 2017-04-25 | Stop reason: HOSPADM

## 2017-04-24 RX ORDER — ACETAMINOPHEN 325 MG/1
650 TABLET ORAL EVERY 4 HOURS PRN
Status: DISCONTINUED | OUTPATIENT
Start: 2017-04-24 | End: 2017-04-25 | Stop reason: HOSPADM

## 2017-04-24 RX ORDER — GLUCAGON 1 MG
1 KIT INJECTION
Status: DISCONTINUED | OUTPATIENT
Start: 2017-04-24 | End: 2017-04-25 | Stop reason: HOSPADM

## 2017-04-24 RX ORDER — POLYETHYLENE GLYCOL 3350 17 G/17G
17 POWDER, FOR SOLUTION ORAL 2 TIMES DAILY PRN
Status: DISCONTINUED | OUTPATIENT
Start: 2017-04-24 | End: 2017-04-25 | Stop reason: HOSPADM

## 2017-04-24 RX ORDER — LAMOTRIGINE 25 MG/1
25 TABLET ORAL NIGHTLY
Status: DISCONTINUED | OUTPATIENT
Start: 2017-04-24 | End: 2017-04-25 | Stop reason: HOSPADM

## 2017-04-24 RX ORDER — IBUPROFEN 200 MG
16 TABLET ORAL
Status: DISCONTINUED | OUTPATIENT
Start: 2017-04-24 | End: 2017-04-25 | Stop reason: HOSPADM

## 2017-04-24 RX ORDER — IBUPROFEN 200 MG
24 TABLET ORAL
Status: DISCONTINUED | OUTPATIENT
Start: 2017-04-24 | End: 2017-04-25 | Stop reason: HOSPADM

## 2017-04-24 RX ORDER — LAMOTRIGINE 25 MG/1
25 TABLET ORAL NIGHTLY
Status: DISCONTINUED | OUTPATIENT
Start: 2017-04-24 | End: 2017-04-24

## 2017-04-24 RX ORDER — LORAZEPAM 2 MG/ML
2 INJECTION INTRAMUSCULAR
Status: DISCONTINUED | OUTPATIENT
Start: 2017-04-24 | End: 2017-04-25 | Stop reason: HOSPADM

## 2017-04-24 RX ADMIN — ENOXAPARIN SODIUM 40 MG: 100 INJECTION SUBCUTANEOUS at 05:04

## 2017-04-24 RX ADMIN — SODIUM CHLORIDE 1000 ML: 0.9 INJECTION, SOLUTION INTRAVENOUS at 12:04

## 2017-04-24 RX ADMIN — POTASSIUM CHLORIDE 40 MEQ: 750 CAPSULE, EXTENDED RELEASE ORAL at 02:04

## 2017-04-24 RX ADMIN — LEVETIRACETAM 500 MG: 5 INJECTION INTRAVENOUS at 11:04

## 2017-04-24 RX ADMIN — LAMOTRIGINE 75 MG: 25 TABLET ORAL at 09:04

## 2017-04-24 RX ADMIN — LAMOTRIGINE 25 MG: 25 TABLET ORAL at 08:04

## 2017-04-24 NOTE — ED PROVIDER NOTES
Encounter Date: 4/23/2017    SCRIBE #1 NOTE: I, Miroslava Triplett, am scribing for, and in the presence of,  Dr. Talbert. I have scribed the following portions of the note - the Resident attestation and the EKG reading.       History     Chief Complaint   Patient presents with    Seizures     witnessed seizure, tonic-clonic about 1 hour ago. +post-ictal upon EMS arrival. denies incontinence. new dx of seizures about 4 months ago     Review of patient's allergies indicates:   Allergen Reactions    Tramadol Other (See Comments)     HPI   Had seizure about 1hr ago.  Stood, took a few steps, friends lowered her to ground, was foaming at the mouth, clenching both arms, this lasted 2-3 min, no bowel or bladder incontinence, meds recently changed to keppra and lamictal.  EMS was called and on arrival she knew who her friends were and where she was, but did seem a little sleepy, and did not recall the event.  She is currently on a titrated antiepileptic medication regimen that she admits she may have been underdosing. Regimen consists of titrating up lamictal, while weaning off keppra.  She has been eating and drinking normally, no vomiting, diarrhea or excessive fluid loss.   Take vivanz and birth control, no changes in either medication.     Past Medical History:   Diagnosis Date    ADD (attention deficit disorder)     Seizures      History reviewed. No pertinent surgical history.  History reviewed. No pertinent family history.  Social History   Substance Use Topics    Smoking status: Never Smoker    Smokeless tobacco: None    Alcohol use Yes     Review of Systems   Neurological: Positive for seizures.        Mild headache.       Physical Exam   Initial Vitals   BP Pulse Resp Temp SpO2   04/23/17 2042 04/23/17 2042 04/23/17 2042 04/23/17 2042 04/23/17 2042   136/83 105 16 98.3 °F (36.8 °C) 100 %     Physical Exam    Nursing note and vitals reviewed.  Constitutional: She appears well-developed. She is not diaphoretic.  No distress.   HENT:   Head: Normocephalic and atraumatic.   Nose: Nose normal.   Mouth/Throat: Oropharynx is clear and moist.   Eyes: Conjunctivae and EOM are normal. Pupils are equal, round, and reactive to light.   Neck: Normal range of motion. Neck supple.   Cardiovascular: Normal heart sounds and intact distal pulses.   No murmur heard.  Pulses:       Dorsalis pedis pulses are 2+ on the right side, and 2+ on the left side.   Abdominal: Soft. Normal appearance and bowel sounds are normal. She exhibits no distension. There is no tenderness.   Musculoskeletal: Normal range of motion. She exhibits no edema or tenderness.   Neurological: She is alert and oriented to person, place, and time. She has normal strength. No cranial nerve deficit or sensory deficit. She exhibits normal muscle tone.   Normal finger-to-nose and heel-to-shin. Gait balanced and unassisted.    Skin: Skin is warm and dry. No pallor.         ED Course   Procedures   Critical Care Procedure Note:  Direct patient critical care time: 10 minutes  Additional history critical care time:10 minutes  Ordering / reviewing labs and studies: critical care time:10 minutes  Documentation critical care time: 10 minutes  Consulting other physicians critical care time: 10 minutes  Total critical care time (exclusive of procedural time) : 50 minutes   Reason for Critical Care: Seizure.    Labs Reviewed   COMPREHENSIVE METABOLIC PANEL - Abnormal; Notable for the following:        Result Value    Potassium 3.2 (*)     CO2 14 (*)     Glucose 132 (*)     Anion Gap 20 (*)     All other components within normal limits    Narrative:     add on per Dr Talbert order #[id number] 04/23/2017  22:40 LAMO   LACTIC ACID, PLASMA - Abnormal; Notable for the following:     Lactate (Lactic Acid) >12.0 (*)     All other components within normal limits   POCT GLUCOSE - Abnormal; Notable for the following:     POCT Glucose 138 (*)     All other components within normal limits   CBC W/  AUTO DIFFERENTIAL   CK    Narrative:     add on per Dr Talbert order #[id number] 04/23/2017  22:40 LAMO   LAMOTRIGINE LEVEL   MAGNESIUM   MAGNESIUM    Narrative:     add on per Dr Talbert order #[id number] 04/23/2017  22:40 LAMO add on   per DR Randall order #[id number] 04/24/2017  01:29 MG   URINALYSIS   LAMOTRIGINE LEVEL    Narrative:     add on per Dr Talbert order #[id number] 04/23/2017  22:40 LAMO   TOXICOLOGY SCREEN, URINE, RANDOM (COMPLIANCE)   BASIC METABOLIC PANEL   MAGNESIUM   PHOSPHORUS   CBC W/ AUTO DIFFERENTIAL   LACTIC ACID, PLASMA   POCT URINE PREGNANCY   POCT GLUCOSE MONITORING CONTINUOUS         EKG: Sinus tachycardia at 137, no MARGO's or STD's, non-specific twave pattern, no STEMI        Medical Decision Making:   History:   Old Medical Records: I decided to obtain old medical records.  Initial Assessment:   HO-I UPDATE  22 y.o. F with seizures since ~3-4 months ago p/w witnessed GTC seizure around 7:30-8 pm this evening. No change in seizure quality. Current rx consists of lamictal and keppra. Physical exam unremarkable. No signs of head trauma or FND. Upon reviewing medication regimen, I noticed that she is under-dosing her meds, and only took 50 mg lamictal today, when she is supposed to take 75 mg lamictal and 1000 mg keppra.  ddx includes: breakthrough seizures;  Other ddx:  ICH less likely as history does not suggest direct head trauma, not taking blood thinners;   e- aberrancy considered however denies hx of excessive fluid loss;   meningitis is considered but less likely as she shows no meningeal signs or FND.   Work up: none.  Tx: will give 25 mg lamictal, 1000 keppra.    Arvin Nevarez M.D.  [facility] EMERG[facility]IC[facility] PGY-1  8:51 PM 04/23/2017    HO-I UPDATE  Pt had 2 witnessed seizures in the ED around 9:40 pm, without return to baseline mental status between episodes. 1st episode lasted 1 min, 30 post-ictal state, then 2nd milder seizure lasted ~30 seconds. She was given 2 mg ativan afterwards, and  remained post-ictal for ~15-20 minutes. Afterwards, she was able to communicate that she was tired. About an hour later, pt was AOx4, complained of fatigue and body soreness. Neuro ICU was consulted. They evaluated and believe that patient is suitable for the IM floor with epilepsy consult.     Arvin Nevarez M.D.  [facility] EMERGENCY MEDICINE PGY-1  12:27 AM 04/24/2017      Independently Interpreted Test(s):   I have ordered and independently interpreted EKG Reading(s) - see prior notes  Clinical Tests:   Lab Tests: Reviewed and Ordered  Medical Tests: Reviewed and Ordered            Scribe Attestation:   Scribe #1: I performed the above scribed service and the documentation accurately describes the services I performed. I attest to the accuracy of the note.    Attending Attestation:   Physician Attestation Statement for Resident:  As the supervising MD   Physician Attestation Statement: I have personally seen and examined this patient.   I agree with the above history. -: Pt presents after having a seizure about 1 hour prior to arrival. This seizure was reportedly generalized, lasted about 2 minutes, and resolved spontaneously. She was reportedly a little sleepy afterwards according to EMS. She did not have bowel or bladder incontinence. No tongue biting. She has been taking Lamictal but has not been escalating it according to the regimen her neurologist wanted her to be on. She stopped taking keppra about a week ago which was part of the plan that her neurologist laid out for her. She denies trauma. One of her friends states they were able to lower her to the ground and did not strike her head. She denies recent fever, nausea, and vomiting. I considered breakthrough seizure secondary to not escalating her Lamictal dose as prescribed. I also considered meningitis, intracranial hemorrhage, electrolyte abnormality, and brain tumor among other diagnoses but ultimately felt these were unlikely. On further clarification her  neurologist does want her to be taking keppra still so I plan to give a keppra loading dose and refill her keppra prescription and have her continue to up her Lamictal as detailed on her antiepileptic plan that we reviewed on her cell phone.     10:13 PM   Called to pt's bedside because of seizure like activity. Pt appeared to be having generalized tonic clonic seizure which lasted about 1 minute. Appeared to be resolving and then started again and lasted about 45 seconds until 2 mg of ativan was given then seizure like activity stopped. Pt is now somnolent. Because of this I felt the pt warranted evaluation by neuro ICU. Neuro ICU recommendations pending.     Neuro ICU felt she was stable for admission to EMU.  She is not awake and able to have a conversation and though a little groggy, which I felt was from the Ativan, I felt she was stable for admit to the EMU as well.    Medicine accepted her for admission.   As the supervising MD I agree with the above PE.    As the supervising MD I agree with the above treatment, course, plan, and disposition.          Physician Attestation for Scribe:  Physician Attestation Statement for Scribe #1: I, Dr. Talbert , reviewed documentation, as scribed by Miroslava Triplett in my presence, and it is both accurate and complete.                 ED Course     Clinical Impression:   The primary encounter diagnosis was Breakthrough seizure. A diagnosis of Seizure was also pertinent to this visit.          Chintan Talbert MD  04/24/17 0147

## 2017-04-24 NOTE — PROGRESS NOTES
Pt transferred to room 702 via wheelchair with gathered personal belongings accompanied by father. Pt denies pain. Pt does not show any signs of distress. Vitals stable. Temp: 98.3. BP: 120/67. O2 sat: 98% on RA. RR: 17. HR: 76. Pt AAO x 4.

## 2017-04-24 NOTE — ASSESSMENT & PLAN NOTE
Most likely 2/2 lactic acidosis, which is most likely 2/2 multiple seizures   - monitor BMP daily

## 2017-04-24 NOTE — CONSULTS
[facility]  Epilepsy Consult Note  Name: Bhavna Han  MRN: [id number]   CSN: [id number]      Date: 04/24/2017    SUBJECTIVE:       History of Present Illness:  The patient is a 22 y.o.  [facility]   The patient was initially referred for consultation by Dr. Randall.      Patient presented to ED on 4/23 after having a witnessed seizure by her friends describes as foaming at the mouth, clenching both arms, this lasted 2-3 min, no bowel or bladder incontinence to ED physician. She then had 2 more seizure sin ED and was given Ativan 2 mg IV once and Keppra 1000 mg IV once. No further seizures overnight. Patient is tired that AM.    Reviewed medications with patient she reports that she is taking Lamictal 50 mg AM and 25 mg PM, and reports she has stopped keppra due to running out.     I have reviewed taper schedule that was given to patient at discharge, she should be taking Lamictal 75 mg daily with plan to increase to 100 mg daily tomorrow and she should still be on Keppra 500 mg BID.     Epilepsy History (Per EMU stay):  Patient presented to ED on 4/1/17. She reports that she had a seizure while taking a shower. States that her roommate heard her yell and hit the ground, when she walked into the bathroom she had whole body stiffening and shaking. Patient reports that she has had 2 seizures in addition to the one on 4/1/17. The first in February that was attributed to drinking and THC use, the second was in March and she was started on Keppra 500 mg BID after that one. Patient does reports that prior to the seizure on Saturday that she had missed 2 night time doses of keppra. Reports with all 3 seizures that she was sleep deprived. Has a history of zoning out spells in school as a child. No further seizures since being admitted.    Seizure Seminology  Seizure Type 1  Classification:   Aura -   Ictus  - Nonconv -  - Conv - whole body stiffening and jerking, +tongue biting   - Duration - 2-3 minutes    Post-ictal  - Symptoms - confused  - Duration  Age of onset - 22  Current Seizure Frequency - 6 total   Last Seizure - 4/24/17     Seizure Triggers  Sleep Deprivation - Yes  Other medications - None  Psych/stress - None  Photic stimulation - None  Hyperventilation - None  Medical Problems - None  Menses - No  Sensory Stimulation (light, sound, etc) - None  Missed dose of meds - Yes     AED Treatments  Present regimen  lamotrigine (Lamictal, LTG) 50 mg AM and 25 mg PM     Prior treatments  Keppra 500 mg BID     Not tried  acetazolamide (Diamox, AZM)  amantadine  carbamazepine (Tegretol, CBZ)  clobezam (Onfi or Frizium, CLB)  ethosuximide (Zarontin, ESM)  eslicarbazine (Aptiom, ESL)  felbamate (felbatol, FBM)  gabapentin (Neurontin, GPN)  lacosamide (Vimpat, LCS)   methsuximide (Celontin, MSM)  methyphenytoin (Mesantion, MHT)  oxcarbazepine (Trileptal OXC)  perampanel (Fycompa, FCP)   phenobarbital (Pb)  phenytoin (Dilantin, PHT)  pregabalin (Lyrica, PGB)  primidone (Mysoline, PRM)  retigabine (Potiga, RTG)  rufinamide (Banzel, RUF)  tiagabine (Gabatril, TGB)  topiramate (Topamax, TPM)  viagabatrin, (Sabril, VGB)  vagal nerve stimulator (VNS)  valproic acid (Depakote, VPA)  zonisamide (Zonegran, ZNA)  Benzodiazepines  diazepam - rectal (Diastatl)  diazepam - oral (Valium, DZ)  clonazepam (Klonopin, CZP)  clorazepate (Tranxene, CLZ)  Ativan  Brain Stimulation  Vagal Nerve Stimulation-n/a  DBS- n/a     Compliance method  Memory - yes  Mom or Spouse - Yes  Pill Box - no  Ian calendar - no  Turn over medication bottle - no  Phone alarm - no     Seizure Evaluation  EEG Routine -   3/13/17  INTERPRETATION: Normal, awake and asleep EEG  EEG Ambulatory -   EEG\Video Monitoring -   4/5/17  ICTAL RECORDINGS: None recorded.     FINAL IMPRESSION:  Seizure Classification: Primary generalized epilepsy.  Lateralization: Not applicable.  Localization: Not applicable.     CLINICAL CORRELATION: The patient is a 22-year-old female  with a history of ADHD. The patient has now experienced 3 convulsions, the first of which occurred on February 17th. She is not aware of any triggers. Family history isnegative. The pattern, however, suggests that the patient has primary generalized epilepsy.  MRI/MRA -   3/1/17  Unremarkable noncontrast MRI of the brain as detailed above specifically without focal parenchymal signal abnormality or acute infarction.  CT/CTA Scan -  4/1/17  Normal Head CT.   PET Scan -   Neuropsychological evaluation -   DEXA Scan     Potential Epilepsy Risk Factors:   Pregnancy/Labor/Delivery - full term uncomplicated pregnancy labor and vaginal delivery  Febrile seizures - No - but reports one episode at 18mo of age when she 'went limp' - Associated with dehydration. No convulsive event noted. Was evaluated at the time.  Head injury - none  CNS infection - none   Stroke - none  Family Hx of Sz - none    Review of Systems:  Constitutional: no fever or chills  Eyes: no visual changes  ENT: no nasal congestion or sore throat  Respiratory: no cough or shortness of breath  Cardiovascular: no chest pain or palpitations  Gastrointestinal: no nausea or vomiting, no abdominal pain or change in bowel habits  Genitourinary: no hematuria or dysuria  Hematologic/Lymphatic: no easy bruising or lymphadenopathy  Musculoskeletal: no arthralgias or myalgias  Neurological: positive for seizures, negative for dizziness, headaches, tremors and weakness  Behavioral/Psych: no auditory or visual hallucinations  Endocrine: no heat or cold intolerance    PAST MEDICAL HISTORY:   Active Ambulatory Problems     Diagnosis Date Noted    Seizure     Breakthrough seizure 04/01/2017    ADHD (attention deficit hyperactivity disorder) 04/02/2017    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 No pertinent surgical history.     FAMILY HISTORY: History reviewed. No pertinent family history.      SOCIAL HISTORY:   Social History     Social History    Marital status: Single     Spouse name: N/A    Number of children: N/A    Years of education: N/A     Occupational History    Not on file.     Social History Main Topics    Smoking status: Never Smoker    Smokeless tobacco: Not on file    Alcohol use Yes    Drug use: Not on file    Sexual activity: Not on file     Other Topics Concern    Not on file     Social History Narrative        SUBSTANCE USE:  Social History     Social History Main Topics    Smoking status: Never Smoker    Smokeless tobacco: Not on file    Alcohol use Yes    Drug use: Not on file    Sexual activity: Not on file      Social History   Substance Use Topics    Smoking status: Never Smoker    Smokeless tobacco: Not on file    Alcohol use Yes        ALLERGIES: Tramadol       OBJECTIVE:     Vital Signs (Most Recent):  Temp: 98.3 °F (36.8 °C) (04/24/17 0800)  Pulse: 74 (04/24/17 0800)  Resp: 17 (04/24/17 0800)  BP: 120/70 (04/24/17 0800)  SpO2: 98 % (04/24/17 0800)    Physical Exam:  General: well developed, well nourished  Eyes: conjunctivae/corneas clear. PERRL..  HENT: Head:normocephalic, atraumatic. Ears:right ear normal, left ear normal. Nose: no discharge. Throat: lips, mucosa, and tongue normal; teeth and gums normal.  Lungs:  normal respiratory effort  Cardiovascular: Heart: regular rate and rhythm. Chest Wall: not examined. Extremities: no cyanosis or edema, or clubbing. Pulses: not examined.    Higher Cortical Function:    Patient is a well developed, pleasant, well groomed individual appearing their stated age  Oriented - intact to person, place and time and followed two step instruction correctly.    Memory - Intact  Fund of knowledge was appropriate.    R-L Orientation -  Intact   Language - Speech was fluent without evidence for an aphasia.    Cranial Nerves II - XII:    EOMs were intact with normal smooth and no nystagmus.    PERRLA.  Visual fields were full to confrontation.    Motor - facial movement was symmetrical and normal.    Facial sensory - Light touch sensations were normal.    Hearing was normal to finger rub.  Palate moved well and was symmetrical with normal palatal and oral sensation.    Tongue movement was full. Normal power and bulk was found in the massiter and rotator muscles of the neck.  Motor: Power, bulk and tone were normal in all extremities.  Sensory: Light touch and position senses were normal in all extremities.    Coordination:  Rapid alternating movements and rapid finger tapping - normal.    Gait:  Not tested patient is a fall risk   Tremor: resting, postural, intentional - none    Laboratory:  CBC:   Recent Labs  Lab 04/24/17  0343   WBC 10.48   RBC 3.89*   HGB 11.5*   HCT 35.2*      MCV 91   MCH 29.6   MCHC 32.7     CMP:   Recent Labs  Lab 04/23/17  2201 04/24/17  0343   * 89   CALCIUM 9.1 8.2*   ALBUMIN 4.0  --    PROT 7.6  --     142   K 3.2* 3.8   CO2 14* 24    111*   BUN 8 7   CREATININE 0.8 0.6   ALKPHOS 73  --    ALT 13  --    AST 16  --    BILITOT 0.3  --      No results for input(s): COLORU, CLARITYU, SPECGRAV, PHUR, PROTEINUA, GLUCOSEU, BILIRUBINCON, BLOODU, WBCU, RBCU, BACTERIA, MUCUS, NITRITE, LEUKOCYTESUR, UROBILINOGEN, HYALINECASTS in the last 168 hours.    Diagnostic Results:  Labs: Reviewed  CT: Reviewed  MRI: Reviewed  EEG: Reviewed    ASSESSMENT/PLAN:     IMPRESSION:  1. Primary Generalized Epilepsy - 3 breakthrough seizures, likely due to running out of Keppra while tapering up Lamictal  2. ADHD      DISPOSITION:    1. Lamictal 75 mg this AM and 25 mg PM, increase to 100 mg tomorrow morning and 25 mg tomorrow night  2. Stop Keppra  3. Seizure precautions  4. IV Valium PRN for GTC greater than 5 minutes  5.  Patient can likely be discharged tomorrow afternoon, if no further seizures, while increasing lamictal and stopping keppra     Discussed with Dr. Rawls

## 2017-04-24 NOTE — SUBJECTIVE & OBJECTIVE
Past Medical History:   Diagnosis Date    ADD (attention deficit disorder)     Seizures        History reviewed. No pertinent surgical history.    Review of patient's allergies indicates:   Allergen Reactions    Tramadol Other (See Comments)       No current facility-administered medications on file prior to encounter.      Current Outpatient Prescriptions on File Prior to Encounter   Medication Sig    [START ON 4/26/2017] lamotrigine (LAMICTAL) 100 MG tablet Take 1 tablet (100 mg total) by mouth once daily.    lamotrigine (LAMICTAL) 25 MG tablet Take 1 tablet (25 mg total) by mouth once daily. Increase by 1 tablet each week for 3 weeks.    lisdexamfetamine (VYVANSE) 50 MG capsule Take 50 mg by mouth every morning.    doxycycline (ADOXA) 50 MG tablet Take 50 mg by mouth daily as needed (for acne).    norethindrone-e.estradiol-iron (LO LOESTRIN FE) 1 mg-10 mcg (24)/10 mcg (2) Tab Take by mouth.     Family History     None        Social History Main Topics    Smoking status: Never Smoker    Smokeless tobacco: Not on file    Alcohol use Yes    Drug use: Not on file    Sexual activity: Not on file     Review of Systems   Constitutional: Negative for chills and fever.   HENT: Negative for sore throat.    Eyes: Negative for visual disturbance.   Respiratory: Negative for cough and shortness of breath.    Cardiovascular: Negative for chest pain and palpitations.   Gastrointestinal: Negative for abdominal pain, constipation, diarrhea, nausea and vomiting.   Genitourinary: Negative for dysuria and urgency.   Musculoskeletal: Negative for arthralgias.   Skin: Negative for rash.   Neurological: Positive for seizures. Negative for light-headedness and headaches.   Psychiatric/Behavioral: Positive for sleep disturbance (decreased sleep lately. Tired.).     Objective:     Vital Signs (Most Recent):  Temp: 98.3 °F (36.8 °C) (04/23/17 2042)  Pulse: 100 (04/24/17 0102)  Resp: 16 (04/24/17 0102)  BP: 129/64 (04/24/17  0102)  SpO2: 97 % (04/24/17 0102) Vital Signs (24h Range):  Temp:  [98.3 °F (36.8 °C)] 98.3 °F (36.8 °C)  Pulse:  [] 100  Resp:  [15-27] 16  SpO2:  [96 %-100 %] 97 %  BP: (124-157)/(64-88) 129/64     Weight: 40.8 kg (90 lb)  Body mass index is 19.48 kg/(m^2).    Physical Exam   Constitutional: She is oriented to person, place, and time. She appears well-developed and well-nourished. No distress.   HENT:   Head: Normocephalic and atraumatic.   Eyes: EOM are normal. Pupils are equal, round, and reactive to light.   Neck: Normal range of motion. Neck supple. No thyromegaly present.   Cardiovascular: Normal rate and regular rhythm.  Exam reveals no gallop and no friction rub.    No murmur heard.  Pulmonary/Chest: Effort normal and breath sounds normal. No respiratory distress. She has no wheezes. She has no rales.   Abdominal: Soft. Bowel sounds are normal. She exhibits no distension and no mass. There is no tenderness. There is no rebound and no guarding.   Musculoskeletal: Normal range of motion. She exhibits no edema or tenderness.   Lymphadenopathy:     She has no cervical adenopathy.   Neurological: She is alert and oriented to person, place, and time. No cranial nerve deficit.   Sleepy, but following commands and answering questions appropriately   Skin: Skin is warm and dry.   Psychiatric: She has a normal mood and affect. Her behavior is normal.   Vitals reviewed.       Significant Labs:   CBC:   Recent Labs  Lab 04/23/17 2201   WBC 7.35   HGB 12.9   HCT 37.0        CMP:   Recent Labs  Lab 04/23/17 2201      K 3.2*      CO2 14*   *   BUN 8   CREATININE 0.8   CALCIUM 9.1   PROT 7.6   ALBUMIN 4.0   BILITOT 0.3   ALKPHOS 73   AST 16   ALT 13   ANIONGAP 20*   EGFRNONAA >60.0     Lactic Acid:   Recent Labs  Lab 04/23/17 2201   LACTATE >12.0*

## 2017-04-24 NOTE — ASSESSMENT & PLAN NOTE
Replacing, though could also be 2/2 shifting with patient's acidosis after seizure.   - repeat BMP in AM.

## 2017-04-24 NOTE — ASSESSMENT & PLAN NOTE
Most likely 2/2 seizures   - repeat lactic acid in AM   - no hypotension, no fever, no signs of infection

## 2017-04-24 NOTE — ASSESSMENT & PLAN NOTE
Patient with 3x seizures tonight   - known hx seizures, diagnosed earlier this year   - recent admission 4/1-4/5 for seizures   - in the process of changing anti-seizure medications, increasing lamictal and decreasing keppra    - currently on 75 lamictal and 500 BID keppra   - since patient had back-to-back seizures without returning to baseline, neuro critical care was consulted by the ED. Note from them pending, but from ED report, they recommend keppra 500 IV BID, lamictal 75/day, and epilepsy consult in AM.   - meds ordered. Ativan PRN seizures.   - lactic acid elevation most likely 2/2 seizures, repeat in AM

## 2017-04-24 NOTE — PROGRESS NOTES
1630: patient arrived on unit at 1630.  AAOx4.  Move all extremities. No complaint of pain. No event of seizure noticed.  VSS. Resting comfortably in bed.  Will continue to monitor.

## 2017-04-24 NOTE — NURSING
"Patient had no new seizure activity this shift. Slept most of shift since admission. When awakened, patient reported feeling very tired, and was noticeably lacking in coordination, but states she thinks it's "because I'm so tired." AAOx4 when awakened. Placed on telemetry monitoring. VS stable. Denies any pain. Will continue to monitor.   "

## 2017-04-24 NOTE — ED NOTES
Patients friends came out door to alert that patient was having a seizure. Dr. Gali MD in room to give orders.

## 2017-04-24 NOTE — ED TRIAGE NOTES
Bhavna Cosmegino Han, a 22 y.o. female presents to the ED      Chief Complaint   Patient presents with    Seizures     witnessed seizure, tonic-clonic about 1 hour ago. +post-ictal upon EMS arrival. denies incontinence. new dx of seizures about 4 months ago     Review of patient's allergies indicates:   Allergen Reactions    Tramadol Other (See Comments)     No past medical history on file.

## 2017-04-24 NOTE — NURSING TRANSFER
Nursing Transfer Note      4/24/2017     Transfer to 702 from [address].    Transfer via wheelchair.    Transfer with gathered personal belongings and father.    Transported by Sherrie Tobar RN.    Medicines sent: none    Chart send with patient: Yes    Notified: LATRICE Fuller.    Patient reassessed at: 4/24/2017 1652    Upon arrival to floor: bed in low position with side rails up x 2, call light within reach

## 2017-04-24 NOTE — H&P
[facility] Hospital Medicine  History & Physical    Patient Name: Bhavna Han  MRN: [id number]  Admission Date: 4/23/2017  Attending Physician: Dr. Coleman  Primary Care Provider: Primary Doctor [facility] Medicine Team: [facility] MED 1 Sergio Randall MD     Patient information was obtained from patient, caregiver / friend, past medical records and ER records.     Subjective:     Principal Problem:Breakthrough seizure    Chief Complaint:   Chief Complaint   Patient presents with    Seizures     witnessed seizure, tonic-clonic about 1 hour ago. +post-ictal upon EMS arrival. denies incontinence. new dx of seizures about 4 months ago        HPI: Patient is a 22 y.o. female with PMH including seizures who presented to the ED with a seizure. Patient had a seizure around 8pm, witnessed by friends, who were able to catch her and lower her to the ground. Patient was foaming at the mouth and clenching both arms, lasted about 3 minutes. No bowel/bladder incontinence. EMS called, pt was sleepy but knew who her friends were and her location, but did not remember the seizure. Patient is currently in the process of titrating up lamictal and off keppra, pt reports she currently is taking 75 mg of lamictal, and 500 mg BID of keppra. Otherwise, denies recent increases in etoh/drugs. No change in her birth control or vyvanse medications. Friends do report increased social stress, and decreased sleep lately. Otherwise no change in appetite, no nausea/vomit/diarrhea/constipation. No fevers/chills. Then while in the ED, patient had 2 additional witnessed seizures around 940, lasted about 1 minute, with ~30 minutes post-ictal, then a 2nd seizure for about 30 seconds, received ativan, post-ictal for 20 minutes. Neuro ICU was consulted, recommended admission to medicine with epilepsy consult.  On assessment around 1am, pt was sleepy, but wakes up to voice. Answers questions appropriately. Oriented. Her only  complaint is being tired.      Past Medical History:   Diagnosis Date    ADD (attention deficit disorder)     Seizures        History reviewed. No pertinent surgical history.    Review of patient's allergies indicates:   Allergen Reactions    Tramadol Other (See Comments)       No current facility-administered medications on file prior to encounter.      Current Outpatient Prescriptions on File Prior to Encounter   Medication Sig    [START ON 4/26/2017] lamotrigine (LAMICTAL) 100 MG tablet Take 1 tablet (100 mg total) by mouth once daily.    lamotrigine (LAMICTAL) 25 MG tablet Take 1 tablet (25 mg total) by mouth once daily. Increase by 1 tablet each week for 3 weeks.    lisdexamfetamine (VYVANSE) 50 MG capsule Take 50 mg by mouth every morning.    doxycycline (ADOXA) 50 MG tablet Take 50 mg by mouth daily as needed (for acne).    norethindrone-e.estradiol-iron (LO LOESTRIN FE) 1 mg-10 mcg (24)/10 mcg (2) Tab Take by mouth.     Family History     None        Social History Main Topics    Smoking status: Never Smoker    Smokeless tobacco: Not on file    Alcohol use Yes    Drug use: Not on file    Sexual activity: Not on file     Review of Systems   Constitutional: Negative for chills and fever.   HENT: Negative for sore throat.    Eyes: Negative for visual disturbance.   Respiratory: Negative for cough and shortness of breath.    Cardiovascular: Negative for chest pain and palpitations.   Gastrointestinal: Negative for abdominal pain, constipation, diarrhea, nausea and vomiting.   Genitourinary: Negative for dysuria and urgency.   Musculoskeletal: Negative for arthralgias.   Skin: Negative for rash.   Neurological: Positive for seizures. Negative for light-headedness and headaches.   Psychiatric/Behavioral: Positive for sleep disturbance (decreased sleep lately. Tired.).     Objective:     Vital Signs (Most Recent):  Temp: 98.3 °F (36.8 °C) (04/23/17 2042)  Pulse: 100 (04/24/17 0102)  Resp: 16 (04/24/17  0102)  BP: 129/64 (04/24/17 0102)  SpO2: 97 % (04/24/17 0102) Vital Signs (24h Range):  Temp:  [98.3 °F (36.8 °C)] 98.3 °F (36.8 °C)  Pulse:  [] 100  Resp:  [15-27] 16  SpO2:  [96 %-100 %] 97 %  BP: (124-157)/(64-88) 129/64     Weight: 40.8 kg (90 lb)  Body mass index is 19.48 kg/(m^2).    Physical Exam   Constitutional: She is oriented to person, place, and time. She appears well-developed and well-nourished. No distress.   HENT:   Head: Normocephalic and atraumatic.   Eyes: EOM are normal. Pupils are equal, round, and reactive to light.   Neck: Normal range of motion. Neck supple. No thyromegaly present.   Cardiovascular: Normal rate and regular rhythm.  Exam reveals no gallop and no friction rub.    No murmur heard.  Pulmonary/Chest: Effort normal and breath sounds normal. No respiratory distress. She has no wheezes. She has no rales.   Abdominal: Soft. Bowel sounds are normal. She exhibits no distension and no mass. There is no tenderness. There is no rebound and no guarding.   Musculoskeletal: Normal range of motion. She exhibits no edema or tenderness.   Lymphadenopathy:     She has no cervical adenopathy.   Neurological: She is alert and oriented to person, place, and time. No cranial nerve deficit.   Sleepy, but following commands and answering questions appropriately   Skin: Skin is warm and dry.   Psychiatric: She has a normal mood and affect. Her behavior is normal.   Vitals reviewed.       Significant Labs:   CBC:   Recent Labs  Lab 04/23/17 2201   WBC 7.35   HGB 12.9   HCT 37.0        CMP:   Recent Labs  Lab 04/23/17 2201      K 3.2*      CO2 14*   *   BUN 8   CREATININE 0.8   CALCIUM 9.1   PROT 7.6   ALBUMIN 4.0   BILITOT 0.3   ALKPHOS 73   AST 16   ALT 13   ANIONGAP 20*   EGFRNONAA >60.0     Lactic Acid:   Recent Labs  Lab 04/23/17 2201   LACTATE >12.0*     Assessment/Plan:     * Breakthrough seizure  Patient with 3x seizures tonight   - known hx seizures, diagnosed  earlier this year   - recent admission 4/1-4/5 for seizures   - in the process of changing anti-seizure medications, increasing lamictal and decreasing keppra    - currently on 75 lamictal and 500 BID keppra   - since patient had back-to-back seizures without returning to baseline, neuro critical care was consulted by the ED. Note from them pending, but from ED report, they recommend keppra 500 IV BID, lamictal 75/day, and epilepsy consult in AM.   - meds ordered. Ativan PRN seizures.   - lactic acid elevation most likely 2/2 seizures, repeat in AM      High anion gap metabolic acidosis  Most likely 2/2 lactic acidosis, which is most likely 2/2 multiple seizures   - monitor BMP daily      Lactic acidosis  Most likely 2/2 seizures   - repeat lactic acid in AM   - no hypotension, no fever, no signs of infection      Sinus tachycardia  Possibly 2/2 seizures   - telemetry monitoring      Hypokalemia  Replacing, though could also be 2/2 shifting with patient's acidosis after seizure.   - repeat BMP in AM.    VTE Risk Mitigation         Ordered     Low Risk of VTE  Once      04/24/17 0106        Sergio Randall MD  Department of Hospital Medicine   [facility]

## 2017-04-24 NOTE — MEDICAL/APP STUDENT
Progress Note  Hospital Medicine      Admit Date: 4/23/2017    SUBJECTIVE:     Follow-up For:  Breakthrough seizure    HPI and Hospital Course: Ms. Bhavna Han is a 21yo lady with PMHx of seizure (diagnosed 2/2/2017) and ADD who presented Monday night after having a witnessed tonic-clonic seizure. Pt was lowered to the ground by her friends, seized for 3 minutes, foamed at the mouth, and had clenched arms. Pt has no memory of the event.    Pt subsequently seized twice in the ED. She was given ativan after the second seizure.     Pt has been tapering up the lamictal and weaning off of keppra. Pt reports depressed mood side effect from keppra.      Interval History: No acute events overnight. Pt was drowsy during conversation, kept falling asleep.     Later conversation with the patient when more awake, she reports marijuana use a few times a month for the past year. She denies use of other illicit drugs. Pt drinks alcohol several times a month. She reports 4 seizures, the first followed alcohol and marijuana use, the second alcohol use, the third and fourth marijuana use. Pt is unsure of a correlation.     Review of Systems:  Constitutional: no fever or chills  Respiratory: no cough or shortness of breath  Cardiovascular: no chest pain or palpitations  Gastrointestinal: no nausea or vomiting, no abdominal pain or change in bowel habits  Genitourinary: no hematuria or dysuria  Integument/Breast: no rash or pruritis  Hematologic/Lymphatic: no easy bruising or lymphadenopathy  Musculoskeletal: no arthralgias or myalgias  Neurological:Seizures and fatigue   Behavioral/Psych: no depression or anxiety    OBJECTIVE:     Vital Signs Range (Last 24H):  Temp:  [97.4 °F (36.3 °C)-98.7 °F (37.1 °C)]   Pulse:  []   Resp:  [15-27]   BP: (116-157)/(64-88)   SpO2:  [96 %-100 %]     I & O (Last 24H):No intake or output data in the 24 hours ending 04/24/17 1354    Physical Exam:  General appearance: no distress, difficult to  arouse   Mental status: Alert and oriented x 3  HEENT:  conjunctivae/corneas clear, PERRL  Neck: supple, thyroid not enlarged  Pulm:   normal respiratory effort, CTA B, no c/w/r  Card: RRR, S1, S2 normal, no murmur, click, rub or gallop  Abd: soft, NT, ND, BS present; no masses, no organomegaly  Ext: no c/c/e  Pulses: 2+, symmetric  Skin: color, texture, turgor normal. No rashes or lesions  Neuro: CN II-XII grossly intact, no focal numbness or weakness, normal strength and tone       Diagnostic Results:  Labs reviewed    Recent Results (from the past 24 hour(s))   CBC auto differential    Collection Time: 04/23/17 10:01 PM   Result Value Ref Range    WBC 7.35 3.90 - 12.70 K/uL    RBC 4.21 4.00 - 5.40 M/uL    Hemoglobin 12.9 12.0 - 16.0 g/dL    Hematocrit 37.0 37.0 - 48.5 %    MCV 88 82 - 98 fL    MCH 30.6 27.0 - 31.0 pg    MCHC 34.9 32.0 - 36.0 %    RDW 12.7 11.5 - 14.5 %    Platelets 285 150 - 350 K/uL    MPV 9.9 9.2 - 12.9 fL    Gran # 4.5 1.8 - 7.7 K/uL    Lymph # 2.1 1.0 - 4.8 K/uL    Mono # 0.6 0.3 - 1.0 K/uL    Eos # 0.1 0.0 - 0.5 K/uL    Baso # 0.04 0.00 - 0.20 K/uL    Gran% 61.4 38.0 - 73.0 %    Lymph% 29.1 18.0 - 48.0 %    Mono% 7.8 4.0 - 15.0 %    Eosinophil% 0.8 0.0 - 8.0 %    Basophil% 0.5 0.0 - 1.9 %    Differential Method Automated    Comprehensive metabolic panel    Collection Time: 04/23/17 10:01 PM   Result Value Ref Range    Sodium 141 136 - 145 mmol/L    Potassium 3.2 (L) 3.5 - 5.1 mmol/L    Chloride 107 95 - 110 mmol/L    CO2 14 (L) 23 - 29 mmol/L    Glucose 132 (H) 70 - 110 mg/dL    BUN, Bld 8 6 - 20 mg/dL    Creatinine 0.8 0.5 - 1.4 mg/dL    Calcium 9.1 8.7 - 10.5 mg/dL    Total Protein 7.6 6.0 - 8.4 g/dL    Albumin 4.0 3.5 - 5.2 g/dL    Total Bilirubin 0.3 0.1 - 1.0 mg/dL    Alkaline Phosphatase 73 55 - 135 U/L    AST 16 10 - 40 U/L    ALT 13 10 - 44 U/L    Anion Gap 20 (H) 8 - 16 mmol/L    eGFR if African American >60.0 >60 mL/min/1.73 m^2    eGFR if non African American >60.0 >60 mL/min/1.73  m^2   Lactic acid, plasma    Collection Time: 04/23/17 10:01 PM   Result Value Ref Range    Lactate (Lactic Acid) >12.0 (HH) 0.5 - 2.2 mmol/L   CPK    Collection Time: 04/23/17 10:01 PM   Result Value Ref Range    CPK 73 20 - 180 U/L   Magnesium    Collection Time: 04/23/17 10:01 PM   Result Value Ref Range    Magnesium 2.3 1.6 - 2.6 mg/dL   POCT glucose    Collection Time: 04/23/17 10:14 PM   Result Value Ref Range    POCT Glucose 138 (H) 70 - 110 mg/dL   Basic Metabolic Panel (BMP)    Collection Time: 04/24/17  3:43 AM   Result Value Ref Range    Sodium 142 136 - 145 mmol/L    Potassium 3.8 3.5 - 5.1 mmol/L    Chloride 111 (H) 95 - 110 mmol/L    CO2 24 23 - 29 mmol/L    Glucose 89 70 - 110 mg/dL    BUN, Bld 7 6 - 20 mg/dL    Creatinine 0.6 0.5 - 1.4 mg/dL    Calcium 8.2 (L) 8.7 - 10.5 mg/dL    Anion Gap 7 (L) 8 - 16 mmol/L    eGFR if African American >60.0 >60 mL/min/1.73 m^2    eGFR if non African American >60.0 >60 mL/min/1.73 m^2   CBC with Automated Differential    Collection Time: 04/24/17  3:43 AM   Result Value Ref Range    WBC 10.48 3.90 - 12.70 K/uL    RBC 3.89 (L) 4.00 - 5.40 M/uL    Hemoglobin 11.5 (L) 12.0 - 16.0 g/dL    Hematocrit 35.2 (L) 37.0 - 48.5 %    MCV 91 82 - 98 fL    MCH 29.6 27.0 - 31.0 pg    MCHC 32.7 32.0 - 36.0 %    RDW 12.8 11.5 - 14.5 %    Platelets 268 150 - 350 K/uL    MPV 9.8 9.2 - 12.9 fL    Gran # 7.2 1.8 - 7.7 K/uL    Lymph # 2.2 1.0 - 4.8 K/uL    Mono # 0.9 0.3 - 1.0 K/uL    Eos # 0.1 0.0 - 0.5 K/uL    Baso # 0.03 0.00 - 0.20 K/uL    Gran% 69.1 38.0 - 73.0 %    Lymph% 21.2 18.0 - 48.0 %    Mono% 8.9 4.0 - 15.0 %    Eosinophil% 0.5 0.0 - 8.0 %    Basophil% 0.3 0.0 - 1.9 %    Platelet Estimate Appears normal     Aniso Slight     Differential Method Automated    Lactic acid, plasma    Collection Time: 04/24/17  3:43 AM   Result Value Ref Range    Lactate (Lactic Acid) 0.7 0.5 - 2.2 mmol/L   Magnesium    Collection Time: 04/24/17  3:43 AM   Result Value Ref Range    Magnesium 2.1  1.6 - 2.6 mg/dL   Phosphorus    Collection Time: 04/24/17  3:43 AM   Result Value Ref Range    Phosphorus 3.5 2.7 - 4.5 mg/dL           ASSESSMENT/PLAN:     21yo female with PMHx of seizure disorder and ADD controlled by vyvanse presented with clonic-tonic seizure currently stable.     Seizures  - 3 in past 24 hours  - Changing meds (increasing lamictal, d/c keppra)  - Ativan PRN seizure control  - Consult epilepsy  - Acetaminophen 650mg q4 PRN for anti-pyretic   - Lamictal 75mg this AM, 25mg this PM; increase tomorrow to 100mg in AM and 25mg in PM     Anion Gap Metabolic Acidosis/Lactic acidosis  - Most likely secondary to seizures  - Monitor BMP  - No hypotension, no fevers - unlikely to be infection   - Lactate was >12 on admit, down to 0.7 this AM  - Bicarb was 14 on admit, down to 24 this AM   - Anion gap was 20 on admit, down to 7 this AM    Sinus tachycardia  - Likely sympathetic response to the seizures  - Monitor on telemetry cardiac monitoring     Hypokalemia   - Replaced with potassium chloride 40mEq once in ED  - Resolved from 3.2 to 3.8     Dispo pending epilepsy consult. Likely d/c tomorrow if no further seizure activity.       Marti Rodriguez MS3

## 2017-04-25 VITALS
SYSTOLIC BLOOD PRESSURE: 132 MMHG | HEIGHT: 57 IN | DIASTOLIC BLOOD PRESSURE: 82 MMHG | TEMPERATURE: 98 F | RESPIRATION RATE: 18 BRPM | OXYGEN SATURATION: 98 % | BODY MASS INDEX: 19.64 KG/M2 | WEIGHT: 91.06 LBS | HEART RATE: 82 BPM

## 2017-04-25 PROBLEM — E87.29 HIGH ANION GAP METABOLIC ACIDOSIS: Status: RESOLVED | Noted: 2017-04-24 | Resolved: 2017-04-25

## 2017-04-25 PROBLEM — R00.0 SINUS TACHYCARDIA: Status: RESOLVED | Noted: 2017-04-24 | Resolved: 2017-04-25

## 2017-04-25 PROBLEM — E87.6 HYPOKALEMIA: Status: RESOLVED | Noted: 2017-04-24 | Resolved: 2017-04-25

## 2017-04-25 PROBLEM — E87.20 LACTIC ACIDOSIS: Status: RESOLVED | Noted: 2017-04-24 | Resolved: 2017-04-25

## 2017-04-25 LAB
AMPHETAMINES SERPL QL: NEGATIVE
ANION GAP SERPL CALC-SCNC: 7 MMOL/L
BARBITURATES SERPL QL SCN: NEGATIVE
BASOPHILS # BLD AUTO: 0.03 K/UL
BASOPHILS NFR BLD: 0.4 %
BENZODIAZ SERPL QL: NEGATIVE
BUN SERPL-MCNC: 8 MG/DL
CALCIUM SERPL-MCNC: 8.8 MG/DL
CANNABINOIDS SERPL QL: POSITIVE
CHLORIDE SERPL-SCNC: 109 MMOL/L
CO2 SERPL-SCNC: 24 MMOL/L
COCAINE, BLOOD: NEGATIVE
CREAT SERPL-MCNC: 0.7 MG/DL
DIFFERENTIAL METHOD: ABNORMAL
EOSINOPHIL # BLD AUTO: 0.2 K/UL
EOSINOPHIL NFR BLD: 2.8 %
ERYTHROCYTE [DISTWIDTH] IN BLOOD BY AUTOMATED COUNT: 13.1 %
EST. GFR  (AFRICAN AMERICAN): >60 ML/MIN/1.73 M^2
EST. GFR  (NON AFRICAN AMERICAN): >60 ML/MIN/1.73 M^2
ETHANOL SERPL-MCNC: NEGATIVE MG/DL
GLUCOSE SERPL-MCNC: 83 MG/DL
HCT VFR BLD AUTO: 34.7 %
HGB BLD-MCNC: 11.8 G/DL
LAMOTRIGINE SERPL-MCNC: 1.7 UG/ML (ref 2–15)
LYMPHOCYTES # BLD AUTO: 3.2 K/UL
LYMPHOCYTES NFR BLD: 47.1 %
MAGNESIUM SERPL-MCNC: 2 MG/DL
MCH RBC QN AUTO: 30.3 PG
MCHC RBC AUTO-ENTMCNC: 34 %
MCV RBC AUTO: 89 FL
METHADONE SERPL QL SCN: NEGATIVE
MONOCYTES # BLD AUTO: 0.6 K/UL
MONOCYTES NFR BLD: 9 %
NEUTROPHILS # BLD AUTO: 2.7 K/UL
NEUTROPHILS NFR BLD: 40.6 %
OPIATES SERPL QL SCN: NEGATIVE
PCP SERPL QL SCN: NEGATIVE
PHOSPHATE SERPL-MCNC: 4 MG/DL
PLATELET # BLD AUTO: 253 K/UL
PMV BLD AUTO: 9.6 FL
POTASSIUM SERPL-SCNC: 3.8 MMOL/L
PROPOXYPH SERPL QL: NEGATIVE
RBC # BLD AUTO: 3.9 M/UL
SODIUM SERPL-SCNC: 140 MMOL/L
WBC # BLD AUTO: 6.75 K/UL

## 2017-04-25 PROCEDURE — 99239 HOSP IP/OBS DSCHRG MGMT >30: CPT | Mod: ,,, | Performed by: INTERNAL MEDICINE

## 2017-04-25 PROCEDURE — 80048 BASIC METABOLIC PNL TOTAL CA: CPT

## 2017-04-25 PROCEDURE — 36415 COLL VENOUS BLD VENIPUNCTURE: CPT

## 2017-04-25 PROCEDURE — 84100 ASSAY OF PHOSPHORUS: CPT

## 2017-04-25 PROCEDURE — 83735 ASSAY OF MAGNESIUM: CPT

## 2017-04-25 PROCEDURE — 85025 COMPLETE CBC W/AUTO DIFF WBC: CPT

## 2017-04-25 PROCEDURE — 25000003 PHARM REV CODE 250

## 2017-04-25 RX ADMIN — LAMOTRIGINE 100 MG: 100 TABLET ORAL at 08:04

## 2017-04-25 NOTE — PLAN OF CARE
04/25/2017      Bhavna Han  [address] [place] [id number]          Hospital Medicine Dept.  [facility] [address] [place] [address]  [phone] after hours  [phone] fax Bhavna Han has been hospitalized at the [facility] since 4/23/2017.  Please excuse the patient from duties.  Patient may return on 4/26/2017.  She should be restricted from driving or operating heavy machinery.     Please contact me if you have any questions.                  __________________________  Rasheeda Lema MD  04/25/2017

## 2017-04-25 NOTE — ASSESSMENT & PLAN NOTE
-Patient presented with seizures, has known hx seizures, diagnosed earlier this year with recent admission 4/1-4/5 for seizures  -Was increasing lamictal (75mg) and decreasing keppra outpatient, but ran out of keppra and discontinued abruptly  -Evaluated by epilepsy, appreciate recs:   1. Lamictal 100 mg this AM and 25 mg PM, patient will take 125 mg daily staring tomorrow x 1 week, then 150 mg daily x 1 week, then 175 mg daily x 1 week, then 200 mg daily (patient given taper schedule and expressed understanding)   2. D/c Keppra   3. Seizure precautions   4. F/u with Dr. Rawls, will arrange appointment    5. Patient okay to be discharged from epilepsy standpoint around 12:30-1:00 pm, would like to make sure patient does not experience side effects from Lamictal prior to discharging

## 2017-04-25 NOTE — SUBJECTIVE & OBJECTIVE
Interval History: NAEON.  Pt has not experienced any further seizures since the ED.  Pt has no complaints this am.      Review of Systems   Constitutional: Negative for chills and fever.   HENT: Negative for sore throat and trouble swallowing.    Eyes: Negative for visual disturbance.   Respiratory: Negative for cough and shortness of breath.    Cardiovascular: Negative for chest pain, palpitations and leg swelling.   Gastrointestinal: Negative for abdominal pain, constipation, diarrhea, nausea and vomiting.   Genitourinary: Negative for dysuria, hematuria and menstrual problem.   Skin: Negative for rash and wound.   Neurological: Negative for seizures and headaches.   Psychiatric/Behavioral: Negative for confusion. The patient is not nervous/anxious.      Objective:     Vital Signs (Most Recent):  Temp: 97.7 °F (36.5 °C) (04/25/17 0804)  Pulse: 75 (04/25/17 0804)  Resp: 16 (04/25/17 0804)  BP: (!) 101/54 (04/25/17 0804)  SpO2: 97 % (04/25/17 0804) Vital Signs (24h Range):  Temp:  [97.7 °F (36.5 °C)-98.8 °F (37.1 °C)] 97.7 °F (36.5 °C)  Pulse:  [] 75  Resp:  [16-18] 16  SpO2:  [96 %-99 %] 97 %  BP: ()/(54-76) 101/54     Weight: 41.3 kg (91 lb 0.8 oz)  Body mass index is 19.7 kg/(m^2).  No intake or output data in the 24 hours ending 04/25/17 1030   Physical Exam   Constitutional: She is oriented to person, place, and time. She appears well-developed and well-nourished. No distress.   HENT:   Head: Normocephalic and atraumatic.   Eyes: Conjunctivae and EOM are normal.   Cardiovascular: Normal rate, regular rhythm, normal heart sounds and intact distal pulses.    No murmur heard.  Pulmonary/Chest: Effort normal and breath sounds normal. No respiratory distress.   Abdominal: Soft. Bowel sounds are normal. She exhibits no distension. There is no tenderness.   Musculoskeletal: Normal range of motion. She exhibits no edema or deformity.   Neurological: She is alert and oriented to person, place, and time.    Skin: Skin is warm and dry. No rash noted. She is not diaphoretic.   Psychiatric: She has a normal mood and affect. Her behavior is normal. Judgment and thought content normal.   Nursing note and vitals reviewed.      Significant Labs:   CBC:   Recent Labs  Lab 04/23/17 2201 04/24/17 0343 04/25/17  0433   WBC 7.35 10.48 6.75   HGB 12.9 11.5* 11.8*   HCT 37.0 35.2* 34.7*    268 253     CMP:   Recent Labs  Lab 04/23/17 2201 04/24/17 0343 04/25/17  0432    142 140   K 3.2* 3.8 3.8    111* 109   CO2 14* 24 24   * 89 83   BUN 8 7 8   CREATININE 0.8 0.6 0.7   CALCIUM 9.1 8.2* 8.8   PROT 7.6  --   --    ALBUMIN 4.0  --   --    BILITOT 0.3  --   --    ALKPHOS 73  --   --    AST 16  --   --    ALT 13  --   --    ANIONGAP 20* 7* 7*   EGFRNONAA >60.0 >60.0 >60.0       Significant Imaging: I have reviewed all pertinent imaging results/findings within the past 24 hours.

## 2017-04-25 NOTE — PLAN OF CARE
"   04/25/17 1406   Readmission Questionnaire   At the time of your discharge, did someone talk to you about what your health problems were? Yes   At the time of discharge, did someone talk to you about what to watch out for regarding worsening of your health problem? Yes   At the time of discharge, did someone talk to you about what to do if you experienced worsening of your health problem? Yes   At the time of discharge, did someone talk to you about which medication to take when you left the hospital and which ones to stop taking? Yes   At the time of discharge, did someone talk to you about when and where to follow up with a doctor after you left the hospital? Yes   What do you believe caused you to be sick enough to be re-admitted? "I had another seizure"   How often do you need to have someone help you when you read instructions, pamphlets, or other written material from your doctor or pharmacy? Never   Do you have problems taking your medications as prescribed? No   Do you have any problems affording any of  your prescribed medications? No   Do you have problems obtaining/receiving your medications? Yes   Does the patient have transportation to healthcare appointments? Yes   Lives With other (see comments)  (Robert student from [employer]. Lives in [place]. Will return home for summer  in 3 wks. )   Living Arrangements other (see comments)   Does the patient have family/friends to help with healtcare needs after discharge? yes   Does your caregiver provide all the help you need? Yes   Are you currently feeling confused? No   Are you currently having problems thinking? No   Are you currently having memory problems? No   Have you felt down, depressed, or hopeless? 0   Have you felt little interest or pleasure in doing things? 0   In the last 7 days, my sleep quality was: good     "

## 2017-04-25 NOTE — CONSULTS
[facility]  Epilepsy Consult Note  Name: Bhavna Han  MRN: [id number]   CSN: [id number]      Date: 04/25/2017    SUBJECTIVE:   Interval History:  No further seizures overnight.     History of Present Illness:  The patient is a 22 y.o.  RHWF   The patient was initially referred for consultation by Dr. Randall.      Patient presented to ED on 4/23 after having a witnessed seizure by her friends describes as foaming at the mouth, clenching both arms, this lasted 2-3 min, no bowel or bladder incontinence to ED physician. She then had 2 more seizure sin ED and was given Ativan 2 mg IV once and Keppra 1000 mg IV once. No further seizures overnight. Patient is tired that AM.    Reviewed medications with patient she reports that she is taking Lamictal 50 mg AM and 25 mg PM, and reports she has stopped keppra due to running out.     I have reviewed taper schedule that was given to patient at discharge, she should be taking Lamictal 75 mg daily with plan to increase to 100 mg daily tomorrow and she should still be on Keppra 500 mg BID.     Epilepsy History (Per EMU stay):  Patient presented to ED on 4/1/17. She reports that she had a seizure while taking a shower. States that her roommate heard her yell and hit the ground, when she walked into the bathroom she had whole body stiffening and shaking. Patient reports that she has had 2 seizures in addition to the one on 4/1/17. The first in February that was attributed to drinking and THC use, the second was in March and she was started on Keppra 500 mg BID after that one. Patient does reports that prior to the seizure on Saturday that she had missed 2 night time doses of keppra. Reports with all 3 seizures that she was sleep deprived. Has a history of zoning out spells in school as a child. No further seizures since being admitted.    Seizure Seminology  Seizure Type 1  Classification:   Aura -   Ictus  - Nonconv -  - Conv - whole body stiffening and  jerking, +tongue biting   - Duration - 2-3 minutes   Post-ictal  - Symptoms - confused  - Duration  Age of onset - 22  Current Seizure Frequency - 6 total   Last Seizure - 4/24/17     Seizure Triggers  Sleep Deprivation - Yes  Other medications - None  Psych/stress - None  Photic stimulation - None  Hyperventilation - None  Medical Problems - None  Menses - No  Sensory Stimulation (light, sound, etc) - None  Missed dose of meds - Yes     AED Treatments  Present regimen  lamotrigine (Lamictal, LTG) 50 mg AM and 25 mg PM     Prior treatments  Keppra 500 mg BID     Not tried  acetazolamide (Diamox, AZM)  amantadine  carbamazepine (Tegretol, CBZ)  clobezam (Onfi or Frizium, CLB)  ethosuximide (Zarontin, ESM)  eslicarbazine (Aptiom, ESL)  felbamate (felbatol, FBM)  gabapentin (Neurontin, GPN)  lacosamide (Vimpat, LCS)   methsuximide (Celontin, MSM)  methyphenytoin (Mesantion, MHT)  oxcarbazepine (Trileptal OXC)  perampanel (Fycompa, FCP)   phenobarbital (Pb)  phenytoin (Dilantin, PHT)  pregabalin (Lyrica, PGB)  primidone (Mysoline, PRM)  retigabine (Potiga, RTG)  rufinamide (Banzel, RUF)  tiagabine (Gabatril, TGB)  topiramate (Topamax, TPM)  viagabatrin, (Sabril, VGB)  vagal nerve stimulator (VNS)  valproic acid (Depakote, VPA)  zonisamide (Zonegran, ZNA)  Benzodiazepines  diazepam - rectal (Diastatl)  diazepam - oral (Valium, DZ)  clonazepam (Klonopin, CZP)  clorazepate (Tranxene, CLZ)  Ativan  Brain Stimulation  Vagal Nerve Stimulation-n/a  DBS- n/a     Compliance method  Memory - yes  Mom or Spouse - Yes  Pill Box - no  Ian calendar - no  Turn over medication bottle - no  Phone alarm - no     Seizure Evaluation  EEG Routine -   3/13/17  INTERPRETATION: Normal, awake and asleep EEG  EEG Ambulatory -   EEG\Video Monitoring -   4/5/17  ICTAL RECORDINGS: None recorded.     FINAL IMPRESSION:  Seizure Classification: Primary generalized epilepsy.  Lateralization: Not applicable.  Localization: Not applicable.     CLINICAL  CORRELATION: The patient is a 22-year-old female with a history of ADHD. The patient has now experienced 3 convulsions, the first of which occurred on February 17th. She is not aware of any triggers. Family history isnegative. The pattern, however, suggests that the patient has primary generalized epilepsy.  MRI/MRA -   3/1/17  Unremarkable noncontrast MRI of the brain as detailed above specifically without focal parenchymal signal abnormality or acute infarction.  CT/CTA Scan -  4/1/17  Normal Head CT.   PET Scan -   Neuropsychological evaluation -   DEXA Scan     Potential Epilepsy Risk Factors:   Pregnancy/Labor/Delivery - full term uncomplicated pregnancy labor and vaginal delivery  Febrile seizures - No - but reports one episode at 18mo of age when she 'went limp' - Associated with dehydration. No convulsive event noted. Was evaluated at the time.  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 No pertinent surgical history.     FAMILY HISTORY: History reviewed. No pertinent family history.      SOCIAL HISTORY:   Social History     Social History    Marital status: Single     Spouse name:  N/A    Number of children: N/A    Years of education: N/A     Occupational History    Not on file.     Social History Main Topics    Smoking status: Never Smoker    Smokeless tobacco: Not on file    Alcohol use Yes    Drug use: Not on file    Sexual activity: Not on file     Other Topics Concern    Not on file     Social History Narrative        SUBSTANCE USE:  Social History     Social History Main Topics    Smoking status: Never Smoker    Smokeless tobacco: Not on file    Alcohol use Yes    Drug use: Not on file    Sexual activity: Not on file      Social History   Substance Use Topics    Smoking status: Never Smoker    Smokeless tobacco: Not on file    Alcohol use Yes        ALLERGIES: Tramadol       OBJECTIVE:     Vital Signs (Most Recent):  Temp: 97.7 °F (36.5 °C) (04/25/17 0804)  Pulse: 75 (04/25/17 0804)  Resp: 16 (04/25/17 0804)  BP: (!) 101/54 (04/25/17 0804)  SpO2: 97 % (04/25/17 0804)    Physical Exam:  General: well developed, well nourished  Eyes: conjunctivae/corneas clear. PERRL..  HENT: Head:normocephalic, atraumatic. Ears:right ear normal, left ear normal. Nose: no discharge. Throat: lips, mucosa, and tongue normal; teeth and gums normal.  Lungs:  normal respiratory effort  Cardiovascular: Heart: regular rate and rhythm. Chest Wall: not examined. Extremities: no cyanosis or edema, or clubbing. Pulses: not examined.    Higher Cortical Function:    Patient is a well developed, pleasant, well groomed individual appearing their stated age  Oriented - intact to person, place and time and followed two step instruction correctly.    Memory - Intact  Fund of knowledge was appropriate.    R-L Orientation - Intact   Language - Speech was fluent without evidence for an aphasia.    Cranial Nerves II - XII:    EOMs were intact with normal smooth and no nystagmus.    PERRLA.  Visual fields were full to confrontation.    Motor - facial movement was symmetrical and normal.    Facial sensory - Light  touch sensations were normal.    Hearing was normal to finger rub.  Palate moved well and was symmetrical with normal palatal and oral sensation.    Tongue movement was full. Normal power and bulk was found in the massiter and rotator muscles of the neck.  Motor: Power, bulk and tone were normal in all extremities.  Sensory: Light touch and position senses were normal in all extremities.    Coordination:  Rapid alternating movements and rapid finger tapping - normal.    Gait:  Not tested patient is a fall risk   Tremor: resting, postural, intentional - none    Laboratory:  CBC:     Recent Labs  Lab 04/25/17  0433   WBC 6.75   RBC 3.90*   HGB 11.8*   HCT 34.7*      MCV 89   MCH 30.3   MCHC 34.0     CMP:   Recent Labs  Lab 04/23/17  2201  04/25/17  0432   *  < > 83   CALCIUM 9.1  < > 8.8   ALBUMIN 4.0  --   --    PROT 7.6  --   --      < > 140   K 3.2*  < > 3.8   CO2 14*  < > 24     < > 109   BUN 8  < > 8   CREATININE 0.8  < > 0.7   ALKPHOS 73  --   --    ALT 13  --   --    AST 16  --   --    BILITOT 0.3  --   --    < > = values in this interval not displayed.    Recent Labs  Lab 04/24/17  1258   COLORU Yellow   SPECGRAV 1.020   PHUR 6.0   PROTEINUA Negative   BACTERIA Rare   NITRITE Negative   LEUKOCYTESUR 1+*   UROBILINOGEN Negative       Diagnostic Results:  Labs: Reviewed  CT: Reviewed  MRI: Reviewed  EEG: Reviewed    ASSESSMENT/PLAN:     IMPRESSION:  1. Primary Generalized Epilepsy - 3 breakthrough seizures, likely due to running out of Keppra while tapering up Lamictal  2. ADHD      DISPOSITION:    1. Lamictal 100 mg this AM and 25 mg PM, patient will take 125 mg daily staring tomorrow x 1 week, then 150 mg daily x 1 week, then 175 mg daily x 1 week, then 200 mg daily (patient given taper schedule and expressed understanding)  2. D/c Keppra  3. Seizure precautions  4. F/u with Dr. Rawls, will arrange appointment   5. Patient okay to be discharged from epilepsy standpoint around  12:30-1:00 pm, would like to make sure patient does not experience side effects from Lamictal prior to discharging     Discussed with Dr. Rawls

## 2017-04-25 NOTE — DISCHARGE SUMMARY
[facility] Hospital Medicine  Discharge Summary      Patient Name: Bhavna Han  MRN: [id number]  Admission Date: 4/23/2017  Hospital Length of Stay: 1 days  Discharge Date and Time:  04/25/2017 1:26 PM  Attending Physician: Kayli Zamora MD   Discharging Provider: Rasheeda Lema MD  Primary Care Provider: Primary Doctor [facility] Medicine Team: [facility] 1 Rasheeda Lema MD    HPI:   Ms. Bhavna Han is a 22 y.o. female with a PMH significant for a new diagnosis of seizures (Feb 2017) who presented to the ED s/p tonic-clonic seizure lasting ~3 min. It was witnessed by friends, who were able to catch her and lower her to the ground. Patient was foaming at the mouth and clenching both arms. She denied bowel/bladder incontinence.  EMS was called, pt was sleepy but knew who her friends were and her location.  She did not remember the seizure. Patient was in the process of titrating up lamictal (75 mg daily) and off keppra, however, she ran out of keppra and discontinued it without tapering. Otherwise, she denied recent increase in etoh/drug use.  She last smoked marijuana 2 days prior to seizure.  She denied change in other meds, birth control or vyvanse.  Her friends reported recent increased social stress, and decreased sleep. Otherwise she denied a change in appetite or nausea/vomit/diarrhea/constipation. No fevers/chills.  In the ED, patient had 2 additional witnessed seizures.  The first lasted ~1 minute, with ~30 minutes post-ictal followed by a 2nd seizure that lasted ~30 seconds which was aborted with ativan, and she remained post-ictal for 20 minutes.  Neuro ICU was consulted with recommendation to admit to hospital medicine with epilepsy consult.    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 The patient's first seizure occurred in February 2017 and was attributed to marijuana use.  An MRI head at that time was unremarkable.  She had another  seizure in March and third seizure on April 1st [place] which [place] [facility]  [place] head at that time was negative.  EEG in April was consistent with primary generalized epilepsy.      Indwelling Lines/Drains at time of discharge:   Lines/Drains/Airways          No matching active lines, drains, or airways        Hospital Course:   The patient did not experience any further seizures after the ED.  She was evaluated by epilepsy who titrated up her lamictal.  She was observed on increased dose without adverse reactions.  Pt was discharged home with expected follow up with Dr. Rawls.  She additionally has follow up in [place] at [facility] with Dr. Krsis Santo.  She will be living there for the summer.       Consults:   Consults         Status Ordering Provider     Inpatient consult to Internal Medicine  Once     Provider:  Bettina Coleman MD    Acknowledged BOBBY CID     Inpatient consult to Neuro ICU  Once     Provider:  Ramone Barraza MD    Acknowledged BOBYB CID     Inpatient consult to Neurology Epilepsy  Once     Provider:  (Not yet assigned)    Completed TERRI MIRZA     Inpatient consult to Neurology Epilepsy  Once     Provider:  (Not yet assigned)    Completed LILIA HUITRON          Significant Diagnostic Studies: Labs:   CMP     Recent Labs  Lab 04/23/17 2201 04/24/17 0343 04/25/17  0432    142 140   K 3.2* 3.8 3.8    111* 109   CO2 14* 24 24   * 89 83   BUN 8 7 8   CREATININE 0.8 0.6 0.7   CALCIUM 9.1 8.2* 8.8   PROT 7.6  --   --    ALBUMIN 4.0  --   --    BILITOT 0.3  --   --    ALKPHOS 73  --   --    AST 16  --   --    ALT 13  --   --    ANIONGAP 20* 7* 7*   ESTGFRAFRICA >60.0 >60.0 >60.0   EGFRNONAA >60.0 >60.0 >60.0    and CBC     Recent Labs  Lab 04/23/17 2201 04/24/17 0343 04/25/17  0433   WBC 7.35 10.48 6.75   HGB 12.9 11.5* 11.8*   HCT 37.0 35.2* 34.7*    268 253       Pending Diagnostic Studies:     Procedure Component Value Units  Date/Time    Toxicology screen, urine [[id number]] Collected:  04/24/17 1122    Order Status:  Sent Lab Status:  In process Updated:  04/24/17 6632    Specimen:  Urine from Urine, Clean Catch         Final Active Diagnoses:    Diagnosis Date Noted POA    PRINCIPAL PROBLEM:  Breakthrough seizure [G40.919] 04/01/2017 Yes      Problems Resolved During this Admission:    Diagnosis Date Noted Date Resolved POA    Hypokalemia [E87.6] 04/24/2017 04/25/2017 Yes    High anion gap metabolic acidosis [E87.2] 04/24/2017 04/25/2017 Yes    Lactic acidosis [E87.2] 04/24/2017 04/25/2017 Yes    Sinus tachycardia [R00.0] 04/24/2017 04/25/2017 Yes      No new Assessment & Plan notes have been filed under this hospital service since the last note was generated.  Service: Hospital Medicine      Discharged Condition: stable    Disposition: Home or Self Care    Follow Up:  Follow-up Information     Go to [facility].    Specialty:  Emergency Medicine    Why:  If symptoms worsen: another seizure, changes in seizure characteristics, head trauma, uncontrollable nausea and vomiting.    Contact information:    [address]  [facility] [address] [phone]        Follow up with your neurologist. Schedule an appointment as soon as possible for a visit in 3 days.    Why:  To follow up on your visit to the ED and review your medication regimen.        Schedule an appointment as soon as possible for a visit with Briseida Rawls MD.    Specialty:  Neurology    Contact information:    [address]  [facility] [id number]  [phone]          Pt stated she will follow up with neurology at [facility], stated she had an appointment with Dr. Kriss Santo.      Patient Instructions: No driving or operating heavy machinery, no swimming.    Diet general     Activity as tolerated     Other restrictions (specify):   Scheduling Instructions: No driving or operating heavy machinery        Medications:  Reconciled Home Medications:   Current Discharge Medication List      CONTINUE these medications which have NOT CHANGED    Details   doxycycline (ADOXA) 50 MG tablet Take 50 mg by mouth daily as needed (for acne).      !! lamotrigine (LAMICTAL) 100 MG tablet Take 1 tablet (100 mg total) by mouth once daily.  Qty: 30 tablet, Refills: 2      !! lamotrigine (LAMICTAL) 25 MG tablet Take 1 tablet (25 mg total) by mouth once daily. Increase by 1 tablet each week for 3 weeks.  Qty: 42 tablet, Refills: 0      lisdexamfetamine (VYVANSE) 50 MG capsule Take 50 mg by mouth every morning.      norethindrone-e.estradiol-iron (LO LOESTRIN FE) 1 mg-10 mcg (24)/10 mcg (2) Tab Take 1 tablet by mouth once daily.        !! - Potential duplicate medications found. Please discuss with provider.      STOP taking these medications       levetiracetam (KEPPRA) 500 MG Tab Comments:   Reason for Stopping:             Time spent on the discharge of patient: 35 minutes    Rasheeda Lema MD  Department of Hospital Medicine  [facility]

## 2017-04-25 NOTE — PROCEDURES
DATE OF SERVICE:  04/24/2017    ELECTROENCEPHALOGRAM REPORT     METHODOLOGY:  Electroencephalographic (EEG) recording is recorded with   electrodes placed according to the International 10-20 placement system.  Thirty   two (32) channels of digital signal (sampling rate of 512/sec), including T1   and T2, were simultaneously recorded from the scalp and may include EKG, EMG,   and/or eye monitors.  Recording band pass was 0.1 to 512 Hz.  Digital video   recording of the patient is simultaneously recorded with the EEG.  The patient   is instructed to report clinical symptoms which may occur during the recording   session.  EEG and video recording are stored and archived in digital format.    Activation procedures, which include photic stimulation, hyperventilation and   instructing patients to perform simple tasks, are done in selected patients  The EEG is displayed on a monitor screen and can be reviewed using different   montages.  Computer assisted-analysis is employed to detect spike and   electrographic seizure activity.   The entire record is submitted for computer   analysis.  The entire recording is visually reviewed, and the times identified   by computer analysis as being spikes or seizures are reviewed again.    Compressed spectral analysis (CSA) is also performed on the activity recorded   from each individual channel.  This is displayed as a power display of   frequencies from 0 to 30 Hz over time.   The CSA is reviewed looking for   asymmetries in power between homologous areas of the scalp, then compared with   the original EEG recording.    Think-Now software was also utilized in the review of this study.  This software   suite analyzes the EEG recording in multiple domains.  Coherence and rhythmicity   are computed to identify EEG sections which may contain organized seizures.    Each channel undergoes analysis to detect the presence of spike and sharp waves   which have special and morphological  characteristics of epileptic activity.  The   routine EEG recording is converted from special into frequency domain.  This is   then displayed comparing homologous areas to identify areas of significant   asymmetry.  Algorithm to identify non-cortically generated artifact is used to   separate artifact from the EEG.    This is a study done on 04/24/2017 for 29 minutes and 2 seconds in duration.    EEG FINDINGS:  The background of this study contains a well-formed 10 Hz   posterior dominant rhythm, which is best seen in the occipital channels during   quiet restfulness.  The anterior background consists of a mix of alpha and beta.    There is a significant amount of central and parasagittal beta activity seen   during restfulness.  The patient falls asleep early in the study with central K   complexes and sleep spindles seen clearly.  There is abundant diffuse beta   during light sleep and high spindle density.  Sleep was recorded for several   minutes.  The patient does eventually arouse near the end of the study with a   return to the prior baseline and alpha rhythm.    No epileptiform discharges were seen during this study.  There were no focal   findings or asymmetries.  EKG revealed normal waveforms with some rhythmic   fluctuation suggesting sinus arrhythmia.    INTERPRETATION:  Normal awake and asleep EEG in a hospitalized patient.    Extensive sleep was captured.      NBB/JUDY  dd: 04/24/2017 15:50:37 (CDT)  td: 04/24/2017 23:36:41 (CDT)  Doc ID   #[id number]  Job ID #[id number]    CC:

## 2017-04-25 NOTE — PROGRESS NOTES
[facility] Hospital Medicine  Progress Note    Patient Name: Bhavna Han  MRN: [id number]  Patient Class: IP- Inpatient   Admission Date: 4/23/2017  Length of Stay: 1 days  Attending Physician: Kayli Zamora MD  Primary Care Provider: Primary Doctor [facility] Medicine Team: [facility] HOSP MED 1 Rasheeda Lema MD    Subjective:     Principal Problem:Breakthrough seizure    Interval History: NAEON.  Pt has not experienced any further seizures since the ED.  Pt has no complaints this am.      Review of Systems   Constitutional: Negative for chills and fever.   HENT: Negative for sore throat and trouble swallowing.    Eyes: Negative for visual disturbance.   Respiratory: Negative for cough and shortness of breath.    Cardiovascular: Negative for chest pain, palpitations and leg swelling.   Gastrointestinal: Negative for abdominal pain, constipation, diarrhea, nausea and vomiting.   Genitourinary: Negative for dysuria, hematuria and menstrual problem.   Skin: Negative for rash and wound.   Neurological: Negative for seizures and headaches.   Psychiatric/Behavioral: Negative for confusion. The patient is not nervous/anxious.      Objective:     Vital Signs (Most Recent):  Temp: 97.7 °F (36.5 °C) (04/25/17 0804)  Pulse: 75 (04/25/17 0804)  Resp: 16 (04/25/17 0804)  BP: (!) 101/54 (04/25/17 0804)  SpO2: 97 % (04/25/17 0804) Vital Signs (24h Range):  Temp:  [97.7 °F (36.5 °C)-98.8 °F (37.1 °C)] 97.7 °F (36.5 °C)  Pulse:  [] 75  Resp:  [16-18] 16  SpO2:  [96 %-99 %] 97 %  BP: ()/(54-76) 101/54     Weight: 41.3 kg (91 lb 0.8 oz)  Body mass index is 19.7 kg/(m^2).  No intake or output data in the 24 hours ending 04/25/17 1030   Physical Exam   Constitutional: She is oriented to person, place, and time. She appears well-developed and well-nourished. No distress.   HENT:   Head: Normocephalic and atraumatic.   Eyes: Conjunctivae and EOM are normal.   Cardiovascular: Normal rate,  regular rhythm, normal heart sounds and intact distal pulses.    No murmur heard.  Pulmonary/Chest: Effort normal and breath sounds normal. No respiratory distress.   Abdominal: Soft. Bowel sounds are normal. She exhibits no distension. There is no tenderness.   Musculoskeletal: Normal range of motion. She exhibits no edema or deformity.   Neurological: She is alert and oriented to person, place, and time.   Skin: Skin is warm and dry. No rash noted. She is not diaphoretic.   Psychiatric: She has a normal mood and affect. Her behavior is normal. Judgment and thought content normal.   Nursing note and vitals reviewed.      Significant Labs:   CBC:   Recent Labs  Lab 04/23/17 2201 04/24/17 0343 04/25/17  0433   WBC 7.35 10.48 6.75   HGB 12.9 11.5* 11.8*   HCT 37.0 35.2* 34.7*    268 253     CMP:   Recent Labs  Lab 04/23/17 2201 04/24/17 0343 04/25/17  0432    142 140   K 3.2* 3.8 3.8    111* 109   CO2 14* 24 24   * 89 83   BUN 8 7 8   CREATININE 0.8 0.6 0.7   CALCIUM 9.1 8.2* 8.8   PROT 7.6  --   --    ALBUMIN 4.0  --   --    BILITOT 0.3  --   --    ALKPHOS 73  --   --    AST 16  --   --    ALT 13  --   --    ANIONGAP 20* 7* 7*   EGFRNONAA >60.0 >60.0 >60.0       Significant Imaging: I have reviewed all pertinent imaging results/findings within the past 24 hours.    Assessment/Plan:      * Breakthrough seizure  -Patient presented with seizures, has known hx seizures, diagnosed earlier this year with recent admission 4/1-4/5 for seizures  -Was increasing lamictal (75mg) and decreasing keppra outpatient, but ran out of keppra and discontinued abruptly  -Evaluated by epilepsy, appreciate recs below:   1. Lamictal 100 mg this AM and 25 mg PM, patient will take 125 mg daily staring tomorrow x 1 week, then 150 mg daily x 1 week, then 175 mg daily x 1 week, then 200 mg daily (patient given taper schedule and expressed understanding)   2. D/c Keppra   3. Seizure precautions   4. F/u with   [facility], will arrange appointment    5. Patient okay to be discharged from epilepsy standpoint around 12:30-1:00 pm, would like to make sure patient does not experience side effects from Lamictal prior to discharging     VTE Risk Mitigation         Ordered     enoxaparin injection 40 mg  Daily     Route:  Subcutaneous        04/24/17 1043     Low Risk of VTE  Once      04/24/17 0106          Rasheeda Lema MD  Department of Hospital Medicine   [facility]

## 2017-04-25 NOTE — DISCHARGE INSTRUCTIONS
Date Name Bhavna Han MRN [id number]      4/26/2017             Lamictal  Lamictal        Dosage 25 mg  100 mg       Yesterday 0   0        2017/4/26 1   1        2017/5/3 2   1        2017/5/10 3   1        2017/5/17 0 stop  2 stay on this dose    Patient should take 25 mg tonight and follow the above taper schedule tomorrow.    Living Well with Epilepsy  People with epilepsy can lead healthy, productive lives. Life with epilepsy can be challenging, but there are things you can do to make it easier. For example, you can pay attention to your emotions. If you feel down, upset, or scared, talk to your healthcare provider. And be open with the people in your life. Talking about epilepsy can help them understand. It can also help you feel better.  Coping with emotions  You may be scared to go out in public for fear of having a seizure. Or you may just get frustrated with having epilepsy. Such feelings are normal. But they can lead to anxiety and depression. Treatment is available for these conditions, so talk to your healthcare provider. Discuss what can help you, such as the following:  · Support groups give you the chance to talk with other people who have epilepsy.  · Counseling helps you learn to cope with your emotions and health problems.  · Medicine can help if you have a mood disorder.     Recognizing signs of depression  Depression is an illness that affects your thoughts and feelings. It can be caused by trouble coping with epilepsy, and sometimes it may be caused by the medicines used to treat it. Depression can be serious. If you have any of the following, call your healthcare provider:  · Feeling down most of the time  · Feeling hopeless or helpless  · Losing pleasure in things you used to enjoy  · Sleeping less or more than usual  · Having a big change in appetite or weight  · Having trouble focusing, remembering, or making decisions  · Isolating yourself from friends or family    Coping at home  Epilepsy  affects those around you, too. Talk with your loved ones and learn their concerns. For instance, your children may be afraid for your safety. Reassure them that you can live a long, healthy life with epilepsy. Your partner may wonder if a normal sex life is possible. Let him or her know that epilepsy doesnt have to affect intimacy. If loved ones have questions, you can always arrange a talk with your healthcare provider.  Epilepsy and your job  Epilepsy doesnt have to keep you from working. In fact, people with epilepsy hold many kinds of jobs. But there are some issues you should consider, such as:  · What kind of work can I do? This depends on several things, like how well controlled your seizures are. Also consider whether the job involves tasks that may not be safe for you. These include driving or operating heavy machinery.  · Should I tell my boss or coworkers about my epilepsy? This is your personal choice. But you may be safer if people at your workplace are prepared to respond to a seizure. If you are concerned about losing your job, know your rights. The Americans with Disabilities Act provides work-related protections for people with epilepsy.  Date Last Reviewed: 9/10/2015  © 7637-3756 [employer]. [address] [place] [address]. All rights reserved. This information is not intended as a substitute for professional medical care. Always follow your healthcare professional's instructions.      Lamotrigine tablets  What is this medicine?  LAMOTRIGINE (la JN tri jeen) is used to control seizures in adults and children with epilepsy and Lennox-Gastaut syndrome. It is also used in adults to treat bipolar disorder.  How should I use this medicine?  Take this medicine by mouth with a glass of water. Follow the directions on the prescription label. Do not chew these tablets. If this medicine upsets your stomach, take it with food or milk. Take your doses at regular intervals. Do not  take your medicine more often than directed.  A special MedGuide will be given to you by the pharmacist with each new prescription and refill. Be sure to read this information carefully each time.  Talk to your pediatrician regarding the use of this medicine in children. While this drug may be prescribed for children as young as 2 years for selected conditions, precautions do apply.  What side effects may I notice from receiving this medicine?  Side effects you should report to your doctor or health care professional as soon as possible:  · allergic reactions like skin rash, itching or hives, swelling of the face, lips, or tongue  · blurred or double vision  · difficulty walking or controlling muscle movements  · fever  · headache, stiff neck, and sensitivity to light  · painful sores in the mouth, eyes, or nose  · redness, blistering, peeling or loosening of the skin, including inside the mouth  · severe muscle pain  · swollen lymph glands  · uncontrollable eye movements  · unusual bruising or bleeding  · unusually weak or tired  · vomiting  · worsening of mood, thoughts or actions of suicide or dying  · yellowing of the eyes or skin  Side effects that usually do not require medical attention (report to your doctor or health care professional if they continue or are bothersome):  · diarrhea or constipation  · difficulty sleeping  · nausea  · tremors  What may interact with this medicine?  · carbamazepine  · female hormones, including contraceptive or birth control pills  · methotrexate  · phenobarbital  · phenytoin  · primidone  · pyrimethamine  · rifampin  · trimethoprim  · valproic acid  What if I miss a dose?  If you miss a dose, take it as soon as you can. If it is almost time for your next dose, take only that dose. Do not take double or extra doses.  Where should I keep my medicine?  Keep out of reach of children.  Store at room temperature between 15 and 30 degrees C (59 and 86 degrees F). Throw away any  unused medicine after the expiration date.  What should I tell my health care provider before I take this medicine?  They need to know if you have any of these conditions:  · a history of depression or bipolar disorder  · aseptic meningitis during prior use of lamotrigine  · folate deficiency  · kidney disease  · liver disease  · suicidal thoughts, plans, or attempt; a previous suicide attempt by you or a family member  · an unusual or allergic reaction to lamotrigine or other seizure medications, other medicines, foods, dyes, or preservatives  · pregnant or trying to get pregnant  · breast-feeding  What should I watch for while using this medicine?  Visit your doctor or health care professional for regular checks on your progress. If you take this medicine for seizures, wear a Medic Alert bracelet or necklace. Carry an identification card with information about your condition, medicines, and doctor or health care professional.  It is important to take this medicine exactly as directed. When first starting treatment, your dose will need to be adjusted slowly. It may take weeks or months before your dose is stable. You should contact your doctor or health care professional if your seizures get worse or if you have any new types of seizures. Do not stop taking this medicine unless instructed by your doctor or health care professional. Stopping your medicine suddenly can increase your seizures or their severity.  Contact your doctor or health care professional right away if you develop a rash while taking this medicine. Rashes may be very severe and sometimes require treatment in the hospital. Deaths from rashes have occurred. Serious rashes occur more often in children than adults taking this medicine. It is more common for these serious rashes to occur during the first 2 months of treatment, but a rash can occur at any time.  You may get drowsy, dizzy, or have blurred vision. Do not drive, use machinery, or do  anything that needs mental alertness until you know how this medicine affects you. To reduce dizzy or fainting spells, do not sit or stand up quickly, especially if you are an older patient. Alcohol can increase drowsiness and dizziness. Avoid alcoholic drinks.  If you are taking this medicine for bipolar disorder, it is important to report any changes in your mood to your doctor or health care professional. If your condition gets worse, you get mentally depressed, feel very hyperactive or manic, have difficulty sleeping, or have thoughts of hurting yourself or committing suicide, you need to get help from your health care professional right away. If you are a caregiver for someone taking this medicine for bipolar disorder, you should also report these behavioral changes right away. The use of this medicine may increase the chance of suicidal thoughts or actions. Pay special attention to how you are responding while on this medicine.  Your mouth may get dry. Chewing sugarless gum or sucking hard candy, and drinking plenty of water may help. Contact your doctor if the problem does not go away or is severe.  Women who become pregnant while using this medicine may enroll in the [place] American Antiepileptic Drug Pregnancy Registry by calling 1-879.848.1070. This registry collects information about the safety of antiepileptic drug use during pregnancy.  Date Last Reviewed:   NOTE:This sheet is a summary. It may not cover all possible information. If you have questions about this medicine, talk to your doctor, pharmacist, or health care provider. Copyright© 2016 [employer]      Levetiracetam tablets  What is this medicine?  LEVETIRACETAM (wendy white) is an antiepileptic drug. It is used with other medicines to treat certain types of seizures.  How should I use this medicine?  Take this medicine by mouth with a glass of water. Follow the directions on the prescription label. Swallow the tablets whole. Do not  crush or chew this medicine. You may take this medicine with or without food. Take your doses at regular intervals. Do not take your medicine more often than directed. Do not stop taking this medicine or any of your seizure medicines unless instructed by your doctor or health care professional. Stopping your medicine suddenly can increase your seizures or their severity.  A special MedGuide will be given to you by the pharmacist with each prescription and refill. Be sure to read this information carefully each time.  Contact your pediatrician or health care professional regarding the use of this medication in children. While this drug may be prescribed for children as young as 4 years of age for selected conditions, precautions do apply.  What side effects may I notice from receiving this medicine?  Side effects you should report to your doctor or health care professional as soon as possible:  · allergic reactions like skin rash, itching or hives, swelling of the face, lips, or tongue  · breathing problems  · dark urine  · general ill feeling or flu-like symptoms  · problems with balance, talking, walking  · unusually weak or tired  · worsening of mood, thoughts or actions of suicide or dying  · yellowing of the eyes or skin  Side effects that usually do not require medical attention (report to your doctor or health care professional if they continue or are bothersome):  · diarrhea  · dizzy, drowsy  · headache  · loss of appetite  What may interact with this medicine?  This medicine may interact with the following medications:  · carbamazepine  · colesevelam  · probenecid  · sevelamer  What if I miss a dose?  If you miss a dose, take it as soon as you can. If it is almost time for your next dose, take only that dose. Do not take double or extra doses.  Where should I keep my medicine?  Keep out of reach of children.  Store at room temperature between 15 and 30 degrees C (59 and 86 degrees F). Throw away any unused  medicine after the expiration date.  What should I tell my health care provider before I take this medicine?  They need to know if you have any of these conditions:  · kidney disease  · suicidal thoughts, plans, or attempt; a previous suicide attempt by you or a family member  · an unusual or allergic reaction to levetiracetam, other medicines, foods, dyes, or preservatives  · pregnant or trying to get pregnant  · breast-feeding  What should I watch for while using this medicine?  Visit your doctor or health care professional for a regular check on your progress. Wear a medical identification bracelet or chain to say you have epilepsy, and carry a card that lists all your medications.  It is important to take this medicine exactly as instructed by your health care professional. When first starting treatment, your dose may need to be adjusted. It may take weeks or months before your dose is stable. You should contact your doctor or health care professional if your seizures get worse or if you have any new types of seizures.  You may get drowsy or dizzy. Do not drive, use machinery, or do anything that needs mental alertness until you know how this medicine affects you. Do not stand or sit up quickly, especially if you are an older patient. This reduces the risk of dizzy or fainting spells. Alcohol may interfere with the effect of this medicine. Avoid alcoholic drinks.  The use of this medicine may increase the chance of suicidal thoughts or actions. Pay special attention to how you are responding while on this medicine. Any worsening of mood, or thoughts of suicide or dying should be reported to your health care professional right away.  Women who become pregnant while using this medicine may enroll in the [place] American Antiepileptic Drug Pregnancy Registry by calling 1-663.187.1919. This registry collects information about the safety of antiepileptic drug use during pregnancy.  Date Last Reviewed:   NOTE:This sheet  is a summary. It may not cover all possible information. If you have questions about this medicine, talk to your doctor, pharmacist, or health care provider. Copyright© 2016 [employer]

## 2017-04-25 NOTE — ASSESSMENT & PLAN NOTE
-Etiology thought to be secondary to discontinuation of keppra without taper  -Evaluated by epilepsy with following recs:   1. Lamictal 100 mg this AM and 25 mg PM, patient will take 125 mg daily staring tomorrow x 1 week, then 150 mg daily x 1 week, then 175 mg daily x 1 week, then 200 mg daily (patient given taper schedule and expressed understanding)   2. D/c Keppra   3. Seizure precautions   4. F/u with Dr. Rawls

## 2017-04-25 NOTE — PLAN OF CARE
Extended Emergency Contact Information  Primary Emergency Contact: Julainne Scott  Address: [address] [place], [place] [address] [place] [address] [place]  Home Phone: [phone]  Mobile Phone: [phone]  Relation: Mother    No future appointments.    Payor: BLUE CROSS BLUE SHIELD / Plan: BCBS ALL OUT OF STATE / Product Type: PPO /       [facility] - [place], [place] - [address] AT [facility]  [address]  [facility] [address]  Phone: [phone] Fax: [phone]       04/25/17 1410   Discharge Assessment   Assessment Type Discharge Planning Assessment   Confirmed/corrected address and phone number on facesheet? Yes   Assessment information obtained from? Patient;Medical Record   Expected Length of Stay (days) 1   Communicated expected length of stay with patient/caregiver yes   Prior to hospitilization cognitive status: Alert/Oriented   Prior to hospitalization functional status: Independent   Current cognitive status: Alert/Oriented   Current Functional Status: Independent   Arrived From home or self-care   Lives With other (see comments)   Able to Return to Prior Arrangements yes   Is patient able to care for self after discharge? Yes   Does the patient have family/friends to help with healtcare needs after discharge? yes   Patient's perception of discharge disposition home or selfcare   Readmission Within The Last 30 Days previous discharge plan unsuccessful   Patient currently being followed by outpatient case management? No   Patient currently receives home health services? No   Does the patient currently use HME? No   Patient currently receives private duty nursing? N/A   Patient currently receives any other outside agency services? No   Equipment Currently Used at Home none   Is the patient taking medications as prescribed? yes   Do you have any financial concerns preventing you from receiving the healthcare you need? No   Does the patient  have transportation to healthcare appointments? Yes   Transportation Available family or friend will provide   Does the patient receive services at the [facility]? No   Are there any open cases? No   Discharge Plan A Home   Discharge Plan B Home with family   Patient/Family In Agreement With Plan yes

## 2017-04-25 NOTE — PLAN OF CARE
Problem: Patient Care Overview  Goal: Plan of Care Review  Outcome: Ongoing (interventions implemented as appropriate)  No adverse/acute events occurred throughout the shift. See flow sheets for assessments and vital signs. Plan of Care reviewed with patient and patients family. All questions answered in turn. Patient progressing towards goals as noted. Will continue to monitor.

## 2017-04-25 NOTE — PHARMACY MED REC
"Admission Medication Reconciliation - Pharmacy Consult Note    The home medication history was taken by Evelia Martinez, Pharmacy Tech.     You may go to "Admission" then "Reconcile Home Medications" tabs to review and/or act upon these items.      No issues noted with the medication reconciliation.      Marjan Pablo, PharmD  [id number]        Patient's prior to admission medication regimen was as follows:  Medication Sig    doxycycline (ADOXA) 50 MG tablet Take 50 mg by mouth daily as needed (for acne).    [START ON 4/26/2017] lamotrigine (LAMICTAL) 100 MG tablet Take 1 tablet (100 mg total) by mouth once daily.    lamotrigine (LAMICTAL) 25 MG tablet Take 1 tablet (25 mg total) by mouth once daily. Increase by 1 tablet each week for 3 weeks. (Patient taking differently: Take 25 mg by mouth 2 (two) times daily. )    lisdexamfetamine (VYVANSE) 50 MG capsule Take 50 mg by mouth every morning.    norethindrone-e.estradiol-iron (LO LOESTRIN FE) 1 mg-10 mcg (24)/10 mcg (2) Tab Take 1 tablet by mouth once daily.          Please add appropriate    SmartPhrase below:        "

## 2017-04-26 ENCOUNTER — TELEPHONE (OUTPATIENT)
Dept: NEUROLOGY | Facility: CLINIC | Age: 22
End: 2017-04-26

## 2017-06-29 RX ORDER — LAMOTRIGINE 25 MG/1
TABLET ORAL
Qty: 90 TABLET | Refills: 0 | OUTPATIENT
Start: 2017-06-29

## 2017-07-29 ENCOUNTER — PATIENT MESSAGE (OUTPATIENT)
Dept: NEUROLOGY | Facility: CLINIC | Age: 22
End: 2017-07-29

## 2017-07-31 ENCOUNTER — PATIENT MESSAGE (OUTPATIENT)
Dept: NEUROLOGY | Facility: CLINIC | Age: 22
End: 2017-07-31

## 2017-09-26 ENCOUNTER — TELEPHONE (OUTPATIENT)
Dept: NEUROLOGY | Facility: CLINIC | Age: 22
End: 2017-09-26

## 2017-09-26 ENCOUNTER — OFFICE VISIT (OUTPATIENT)
Dept: NEUROLOGY | Facility: CLINIC | Age: 22
End: 2017-09-26
Payer: COMMERCIAL

## 2017-09-26 DIAGNOSIS — R56.9 SEIZURE: ICD-10-CM

## 2017-09-26 DIAGNOSIS — G40.319 INTRACTABLE GENERALIZED IDIOPATHIC EPILEPSY WITHOUT STATUS EPILEPTICUS: Primary | ICD-10-CM

## 2017-09-26 PROCEDURE — 99215 OFFICE O/P EST HI 40 MIN: CPT | Mod: S$GLB,,, | Performed by: PSYCHIATRY & NEUROLOGY

## 2017-09-26 PROCEDURE — 3008F BODY MASS INDEX DOCD: CPT | Mod: S$GLB,,, | Performed by: PSYCHIATRY & NEUROLOGY

## 2017-09-26 NOTE — PROGRESS NOTES
[facility]:   FOLLOW UP VISIT    Name: Bhavna Han  MRN:[id number]   CSN: [id number]  Date of service: 9/26/2017    EMU evaluation, 4/3-5/2017  Last (1st) clinic visit: 2/16/17    Age:22 y.o.   Gender:female     The patient is here today with her friend  History obtained from the patient and her friend    CHIEF COMPLAINT:  - follow-up for seizures    INTERVAL HISTORY (Since last visit):    This is a 22 y.o. right handed female who presents for follow-up for seizures  She was last seen by me on 2/16/2017          Was doing well; compliant with LTG ER 200mg q day    2 recent episodes:  1) end of Aug 2017:  1st week of school; patient was with her friend and woke up on the ground - was caught by her friend and did not fall. Episode lasted ~ 2 min (convulsion) - not assoc w tongue bite or b/b incontinence. Post-ictal brief confusion.  EMS arrived and was evaluated on scene - patient refused to go to hospital but was at baseline at the time.  No hx of sleep deprivation; no missed doses of AED  No identifiable triggers (had changed AED to XR in July)    2) 2nd episode occurred today: lasted ~ 4 min  - reports recent stress with school, but no other identifiable triggers; no missed doses of AED  - had a test today and shortly after and had a convulsion; no hx of tongue bite or b/b incontinence. Post-ictal brief confusion.    Hospital admission 4/23-25/2017 after 3 GTC seizures, related to missed meds    EEG Reports:  EMU evaluation, 4/3-5/2017: no seizures recorded, but throughout the waking and also sleeping state, 1 to 2 second bursts of 3-1/2 Hz spike wave discharges were seen, which were present symmetrically over both hemispheres and maximum amplitude in the frontal region. Impression: Primary generalized epilepsy  R-EEG, 4/24/17: Normal awake and asleep EEG  R-EEG, 3/3/17: Impression: Normal awake and asleep EEG    SEIZURE HISTORY:  1st seizure on 02/02/2017:  Patient states that she was out the night prior  the seizure, was up until ~ 2:30am and admits to having etoh (admits to be being drunk) and smoking marijuana. She was on antibiotics for UTI at the time as well.  Reports waking up feeling fine (denies any hangover) and ~09:00 she was walking to her dorm followed by LOC. Her next recollection was waking up on the ground with people around her.  She reportedly fell to the ground and had generalized shaking with her eyes rolled up and frothing at the mouth - lasted ~ 2-3 min. Patient reports associated tongue bit but  No hx of bladder/bowel incontinence. Reports brief (few min) post-ictal confusion.  EMS was called and she was evaluated at [facility]: Labs showed methamphetamine and marijuana positive; CT head was negative, and no neurological deficits. Not started on any meds.     No further episodes.     Possible lateralizing signs by history: (Neurological signs): none     Any other relevant information:  Hx of 'blackout' episodes with etoh use - approximately 5 episodes    PREVIOUS EVALUATIONS:     Results for orders placed or performed during the hospital encounter of 04/01/17   CT Head Without Contrast    Narrative    HISTORY:  seizure/ struck head    TECHNIQUE: 5 mm axial images were acquired from the vertex to the skullbase, without administration of contrast. Multiplanar reconstructions were provided for review.    COMPARISON: MRI brain without contrast 03/01/2017    FINDINGS:    Brain and intracranial structures:  Ventricles and sulci are normal.  Gray-white differentiation is preserved. There is no mass lesion, hemorrhage, or acute infarct.    Skull:  Normal.    Scalp: Normal.    Orbits and face (included portions): Normal.    Paranasal sinuses and mastoid air cells (included portions): Normal.    Impression    Normal Head CT.  ______________________________________     Electronically signed by resident: CAROLINE DE LEÓN MD  Date:     04/01/17  Time:    18:19            As the supervising and teaching physician, I  personally reviewed the images and resident's interpretation and I agree with the findings.          Electronically signed by: SUAD MEDINA MD  Date:     04/01/17  Time:    18:20    Results for orders placed or performed during the hospital encounter of 03/01/17   MRI Brain Without Contrast    Narrative    Procedure: MRI the brain without contrast.    Technique: Sagittal and axial T1, axial/coronal T2, axial FLAIR, axial gradient, and axial diffusion imaging of the whole brain. Additional thin section coronal imaging performed through the temporal lobes    Comparison: None    Findings: The brain parenchyma is normal in signal and contour.  The ventricles are normal in size and configuration without evidence for hydrocephalus.  There is no midline shift or mass-effect.  There is no diffusion signal abnormality to suggest acute infarction.  There is no abnormal parenchymal gradient susceptibility.  The major intracranial T2 flow voids are present.  No abnormal intra-or extra-axial fluid collection.  Temporal lobes are relatively symmetric. Study is slightly limited by lack of contrast.    Impression     Unremarkable noncontrast MRI of the brain as detailed above specifically without focal parenchymal signal abnormality or acute infarction.      Electronically signed by: WYATT SINGH DO  Date:     03/01/17  Time:    16:15       Results for orders placed or performed during the hospital encounter of 04/01/17   CT Head Without Contrast    Narrative    HISTORY:  seizure/ struck head    TECHNIQUE: 5 mm axial images were acquired from the vertex to the skullbase, without administration of contrast. Multiplanar reconstructions were provided for review.    COMPARISON: MRI brain without contrast 03/01/2017    FINDINGS:    Brain and intracranial structures:  Ventricles and sulci are normal.  Gray-white differentiation is preserved. There is no mass lesion, hemorrhage, or acute infarct.    Skull:  Normal.    Scalp:  Normal.    Orbits and face (included portions): Normal.    Paranasal sinuses and mastoid air cells (included portions): Normal.    Impression    Normal Head CT.  ______________________________________     Electronically signed by resident: CAROLINE DE LEÓN MD  Date:     04/01/17  Time:    18:19            As the supervising and teaching physician, I personally reviewed the images and resident's interpretation and I agree with the findings.          Electronically signed by: SUAD MEDINA MD  Date:     04/01/17  Time:    18:20    Results for orders placed or performed during the hospital encounter of 03/01/17   MRI Brain Without Contrast    Narrative    Procedure: MRI the brain without contrast.    Technique: Sagittal and axial T1, axial/coronal T2, axial FLAIR, axial gradient, and axial diffusion imaging of the whole brain. Additional thin section coronal imaging performed through the temporal lobes    Comparison: None    Findings: The brain parenchyma is normal in signal and contour.  The ventricles are normal in size and configuration without evidence for hydrocephalus.  There is no midline shift or mass-effect.  There is no diffusion signal abnormality to suggest acute infarction.  There is no abnormal parenchymal gradient susceptibility.  The major intracranial T2 flow voids are present.  No abnormal intra-or extra-axial fluid collection.  Temporal lobes are relatively symmetric. Study is slightly limited by lack of contrast.    Impression     Unremarkable noncontrast MRI of the brain as detailed above specifically without focal parenchymal signal abnormality or acute infarction.      Electronically signed by: WYATT SINGH DO  Date:     03/01/17  Time:    16:15       Additional tests:  1)CT Scan: no abnormalities (per report, [facility], 2/2/17)  2) EEG\Video Monitoring: no  3) PET Scan: no  4) Neuropsychological evaluation: no  5) DEXA Scan: no  6) Others: no     RISK FACTORS FOR SEIZURES:    1. Head Trauma:  No    2.  CNS Infections:  No  3. CNS Tumors: No     4. CNS Vascular Disease: No     5. Febrile Seizures: No  - but reports one episode at 18mo of age when she 'went limp' -  Associated with dehydration. No convulsive event noted. Was evaluated at the time.  6. Developmental Delay: No       7. Family History of Seizures: No    8. Birth history: FTNVD     Pregnancy/Labor/Delivery: n/a    CURRENT MEDICATIONS:   Current Outpatient Prescriptions   Medication Sig Dispense Refill    doxycycline (ADOXA) 50 MG tablet Take 50 mg by mouth daily as needed (for acne).      lamotrigine (LAMICTAL) 100 MG tablet Take 1 tablet (100 mg total) by mouth once daily. 30 tablet 2    lamotrigine (LAMICTAL) 25 MG tablet Take 1 tablet (25 mg total) by mouth once daily. Increase by 1 tablet each week for 3 weeks. (Patient taking differently: Take 25 mg by mouth 2 (two) times daily. ) 42 tablet 0    lisdexamfetamine (VYVANSE) 50 MG capsule Take 50 mg by mouth every morning.      norethindrone-e.estradiol-iron (LO LOESTRIN FE) 1 mg-10 mcg (24)/10 mcg (2) Tab Take 1 tablet by mouth once daily.        No current facility-administered medications for this visit.         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     FAMILY HISTORY: No family history on file.      SOCIAL HISTORY:   Social History     Social History    Marital status: Single     Spouse name: N/A    Number of children: N/A    Years of education: N/A     Occupational History    Not on file.     Social History Main Topics    Smoking status: Never Smoker    Smokeless tobacco: Not on file    Alcohol use Yes    Drug use: Unknown    Sexual activity: Not on file     Other Topics Concern    Not on file     Social History Narrative    No narrative on file      a) Marital status: Single                                                    b) Living situation: patient lives with in the dorm at [place]  c) Employed/Unemployed/Other: Student - [employer] at [employer]     DRIVING HISTORY:  Currently driving: No       LEVEL OF EDUCATION: student at [employer]     SUBSTANCE USE: uses marijuana occasionally - 1-2 per month; regular etoh use 1-2 per week; no other substance use     REVIEW OF SYSTEMS:  Review of Systems   Constitutional: Negative for appetite change and fatigue.   HENT: Negative for dental problem and sore throat.    Eyes: Negative for photophobia and pain.   Respiratory: Negative for cough and shortness of breath.    Cardiovascular: Negative for chest pain and palpitations.   Gastrointestinal: Negative for nausea and vomiting.   Endocrine: Negative for polydipsia and polyuria.   Genitourinary: Negative for menstrual problem and vaginal bleeding.   Musculoskeletal: Negative for arthralgias and joint swelling.   Skin: Negative for rash and wound.   Allergic/Immunologic: Negative for immunocompromised state.   All other systems reviewed and are negative.       GENERAL EXAMINATION:  There were no vitals taken for this visit.     GENERAL EXAMINATION:  General Appearance:    This is an average built male who appears well.  HEENT: There are no dysmorphic features  Skin: There are no obvious  stigmata for neurocutaneous disorders.  Neck: supple   Cardiovascular: regular rate and rhythm  Lungs: clear  Abdomen: deferred  The spine is non-tender.  Kyphosis:  NoScoliosis: No   Extremities: Warm and well perfused    NEUROLOGICAL EXAMINATION:  Mental status: Alert and oriented x 4; pleasant and cooperative with exam  Memory: Recent memory intact, Remote memory intact, Age correct, Month correct  Attention and concentration: intact  Fund of knowledge: adequate  Speech: normal  Dysarthria: No   Eyes: PERRL; EOM intact; No nystagmus.The visual pursuits  were smooth with normal saccadic eye movements. Fundoscopic Exam: deferred  No facial asymmetry. Intact facial sensation bilaterally.  Hearing was intact bilaterally to finger rub  Tongue and palate was in the midline  Intact SCM and trapezii bilaterally     Motor examination:  Normal bulk and tone bilaterally. 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  More than 50% of the time with the patient (as well as family/caregiver(s) was spent on face-to-face counseling about:  1. Diagnosis, plans, prognosis, medications and possible side-effects, risks and benefits of treatment,  other alternatives to AEDs.  2. Risks related to continued seizures, status epilepticus, SUDEP, driving restrictions and seizure precautions ( no baths but showers are ok, no swimming unsupervised, no use of heavy machinery, no use of sharp moving objects, avoid heights).   3. Issues related to pregnancy, OCP and breast feeding as it relates to epilepsy (in female patients).  4. The potential of teratogenicity (for female patients) and suicidal risks of anti-epileptic medications.  5.Avoid any activity that compromise patient safety related to seizures.    Questions and concerns raised by the patient and family/care-giver(s) were addressed and they indicated understanding of everything discussed and agreed to plans as above.    Return in 3-4 months or earlier prn    Briseida Rawls MD, TOM(), FACNS.  Neurology-Epilepsy.

## 2017-09-26 NOTE — TELEPHONE ENCOUNTER
Spoke to Pt she was advised  we will keep  the scheduled appt for Friday she was advised if things get worst to go to the ER Pt states understanding       ---- Message from Ilda Guzman sent at 9/26/2017  1:30 PM CDT -----  Contact: Pt  Pt says she just has another seizure today and needs to know if she can come in sooner    Pt says she will visit with any nurse practitioner or nurse that is available     Pt is looking to come in on tomorrow    Pt can be reached at [phone]    Thanks

## 2017-10-02 NOTE — ASSESSMENT & PLAN NOTE
23yo F with hx of seizures since 2/2017; EMU evaluation showing interictal generalized discharges, suggestive of PGE  - recent (2) breakthrough seizures     On Lamotrigine ER 200mg q day    Check levels  Avoid triggers  Seizure precautions/restrictions    Plan of care was discussed in detail with patient

## 2017-10-28 ENCOUNTER — HOSPITAL ENCOUNTER (EMERGENCY)
Facility: HOSPITAL | Age: 22
Discharge: HOME | End: 2017-10-28
Attending: EMERGENCY MEDICINE
Payer: COMMERCIAL

## 2017-10-28 VITALS
OXYGEN SATURATION: 98 % | WEIGHT: 90 LBS | HEART RATE: 88 BPM | TEMPERATURE: 98 F | DIASTOLIC BLOOD PRESSURE: 67 MMHG | BODY MASS INDEX: 18.14 KG/M2 | HEIGHT: 59 IN | SYSTOLIC BLOOD PRESSURE: 107 MMHG | RESPIRATION RATE: 16 BRPM

## 2017-10-28 DIAGNOSIS — G40.909 SEIZURE DISORDER: Primary | ICD-10-CM

## 2017-10-28 LAB
BUN SERPL-MCNC: 9 MG/DL (ref 6–30)
CHLORIDE SERPL-SCNC: 102 MMOL/L (ref 95–110)
CREAT SERPL-MCNC: 0.7 MG/DL (ref 0.5–1.4)
GLUCOSE SERPL-MCNC: 84 MG/DL (ref 70–110)
HCT VFR BLD CALC: 42 %PCV (ref 36–54)
POC IONIZED CALCIUM: 1.18 MMOL/L (ref 1.06–1.42)
POC TCO2 (MEASURED): 26 MMOL/L (ref 23–29)
POTASSIUM BLD-SCNC: 3.8 MMOL/L (ref 3.5–5.1)
SAMPLE: NORMAL
SODIUM BLD-SCNC: 138 MMOL/L (ref 136–145)

## 2017-10-28 PROCEDURE — 99283 EMERGENCY DEPT VISIT LOW MDM: CPT

## 2017-10-28 PROCEDURE — 99285 EMERGENCY DEPT VISIT HI MDM: CPT | Mod: ,,, | Performed by: EMERGENCY MEDICINE

## 2017-10-28 NOTE — ED PROVIDER NOTES
Encounter Date: 10/28/2017       History     Chief Complaint   Patient presents with    Seizures     Pt with seizure today, states it is her normal seizure activity, but she jsut wanto to get everything checked out.  Pt without complaints at present.     Ms. Bhavna Han is a 23 yo female with a history of recurrent seizures presents with 1 episode of a seizures. She reports she has had a seizure about every single month since having her first seizure earlier this year. Today she reports being at home and had a seizure lasting about 1-2 mins. Her friend in the room reports she was confused for about 5-10 minutes afterwards and returned back to her baseline. She is currently following up with her neurologist Dr. Rawls. She is currently on Lamotrigine which she takes daily. She reports she did not hit her head or suffer any serious trauma from this episode. She denies urinary incontinence, headaches, tongue biting, n/v, recent illness or changes in vision.           Review of patient's allergies indicates:   Allergen Reactions    Benadryl [diphenhydramine hcl] Other (See Comments)    Tramadol Other (See Comments)     Past Medical History:   Diagnosis Date    ADD (attention deficit disorder)     Seizures      Past Surgical History:   Procedure Laterality Date    wisdom teeth  2012     History reviewed. No pertinent family history.  Social History   Substance Use Topics    Smoking status: Never Smoker    Smokeless tobacco: Never Used    Alcohol use Yes     Review of Systems   Constitutional: Negative for activity change, chills and fever.   HENT: Negative for congestion, postnasal drip, rhinorrhea and sinus pressure.    Eyes: Negative for discharge, redness and visual disturbance.   Respiratory: Negative for cough and shortness of breath.    Cardiovascular: Negative for chest pain and palpitations.   Gastrointestinal: Negative for abdominal distention, abdominal pain, constipation, diarrhea, nausea and vomiting.    Endocrine: Negative for cold intolerance and heat intolerance.   Genitourinary: Negative for dysuria.   Musculoskeletal: Negative for arthralgias and back pain.   Skin: Negative for color change and pallor.   Neurological: Positive for seizures. Negative for dizziness, speech difficulty and headaches.   Psychiatric/Behavioral: Negative for agitation, sleep disturbance and suicidal ideas. The patient is not nervous/anxious.        Physical Exam     Initial Vitals [10/28/17 1346]   BP Pulse Resp Temp SpO2   107/67 88 16 98 °F (36.7 °C) 98 %      MAP       80.33         Physical Exam    Constitutional: She appears well-developed and well-nourished.   HENT:   Head: Normocephalic and atraumatic.   Eyes: EOM are normal. Pupils are equal, round, and reactive to light.   Neck: Normal range of motion. Neck supple. No thyromegaly present.   Cardiovascular: Normal rate and regular rhythm.   Pulmonary/Chest: Breath sounds normal. No respiratory distress. She has no wheezes.   Abdominal: Soft. Bowel sounds are normal. She exhibits no distension. There is no tenderness.   Musculoskeletal: Normal range of motion. She exhibits no edema or tenderness.   Neurological: She is alert and oriented to person, place, and time. No cranial nerve deficit.   Skin: Skin is warm and dry. Capillary refill takes less than 2 seconds.       ED Course   Procedures  Labs Reviewed   ISTAT CHEM8             Medical Decision Making:   Clinical Tests:   Lab Tests: Ordered and Reviewed  The following lab test(s) were unremarkable: CBC and BMP  ED Management:  Patient is a 21yo female presenting with a recent onset of a seizure. Differential diagnosis includes but is not limited to seizure disorder vs medication non-compliance vs substance abuse vs electrolyte imbalance. We will obtain an istat chem8.     Reassessment: Patient's CBC and CMP with normal limits. She can be discharged home and will follow up with neurology outpatient. Seizures likely 2/2 to  seizure disorder.                    ED Course      Clinical Impression:   The encounter diagnosis was Seizure disorder.                           Tony Mejia MD  Resident  11/07/17 [id number]

## 2017-10-28 NOTE — ED NOTES
LOC: The patient is awake, alert, and oriented to place, time, situation. Affect is appropriate.  Speech is appropriate and clear.  Seizure today at 1 pm.  Reports did have a headache but resolved.      APPEARANCE: Patient resting comfortably in no acute distress.  Patient is clean and well groomed.    SKIN: The skin is warm and dry; color consistent with ethnicity.  Patient has normal skin turgor and moist mucus membranes.  Skin intact; no breakdown or bruising noted.     MUSCULOSKELETAL: Patient moving upper and lower extremities without difficulty.  Denies weakness.     RESPIRATORY: Airway is open and patent. Respirations spontaneous, even, easy, and non-labored.  Patient has a normal effort and rate.  No accessory muscle use noted. Denies cough.     CARDIAC:   No peripheral edema noted. No complaints of chest pain.      ABDOMEN: Soft and non tender to palpation.  No distention noted.     NEUROLOGIC: Eyes open spontaneously.  Behavior appropriate to situation.  Follows commands; facial expression symmetrical.  Purposeful motor response noted; normal sensation in all extremities. Seizure today at 1 pm lasting 1 minute, witnessed by roommates. Reports being confused for 5-10 minutes following.

## 2017-10-28 NOTE — ED TRIAGE NOTES
Comes to the ED after having a witnessed seizure at 1 pm today lasting 1 minute.  Fell but didn't hit her head, was not incontinent.

## 2017-10-28 NOTE — ED NOTES
Pt state she had diarrhea yesterday and went out last night. Took her meds today and has not had a seizure since yesterday.

## 2017-10-28 NOTE — DISCHARGE INSTRUCTIONS
In the ED, we ordered basic electrolyte labs which have returned normal. We recommend you follow up with your neurologist. Please return if you develop confusion, worsening headaches, fevers, or nausea/vomiting.
